# Patient Record
Sex: MALE | Employment: FULL TIME | URBAN - METROPOLITAN AREA
[De-identification: names, ages, dates, MRNs, and addresses within clinical notes are randomized per-mention and may not be internally consistent; named-entity substitution may affect disease eponyms.]

---

## 2018-01-03 NOTE — TELEPHONE ENCOUNTER
APPT INFO    Date /Time: 1/16/18 3:00 PM    Reason for Appt: oversupination   Ref Provider/Clinic: Self   Are there internal records? Yes/No?  IF YES, list clinic names: No   Are there outside records? Yes/No? No   Patient Contact (Y/N) & Call Details: Yes - spoke with patient on the phone. Stated he has no records for this. This is the first time being evaluated. No x-rays have been done.    Action: Closing encounter.

## 2018-01-16 ENCOUNTER — PRE VISIT (OUTPATIENT)
Dept: ORTHOPEDICS | Facility: CLINIC | Age: 23
End: 2018-01-16

## 2018-01-17 ENCOUNTER — OFFICE VISIT (OUTPATIENT)
Dept: ORTHOPEDICS | Facility: CLINIC | Age: 23
End: 2018-01-17
Payer: COMMERCIAL

## 2018-01-17 DIAGNOSIS — Q66.30 VARUS DEFORMITY OF FOOT: Primary | ICD-10-CM

## 2018-01-17 NOTE — MR AVS SNAPSHOT
After Visit Summary   1/17/2018    Yann Perales    MRN: [id number]           Patient Information     Date Of Birth          1995        Visit Information        Provider Department      1/17/2018 7:20 AM Mihai Matthews DPM [facility]        Today's Diagnoses     Varus deformity of foot    -  1       Follow-ups after your visit        Additional Services     ORTHOTICS REFERRAL (Foot and Ankle)       Please be aware that coverage of these services is subject to the terms and limitations of your health insurance plan.  Call member services at your health plan with any benefit or coverage questions.      Please bring the following to your appointment:    >>   Any x-rays, CTs or MRIs which have been performed.  Contact the facility where they were done to arrange for  prior to your scheduled appointment.  Any new CT, MRI or other procedures ordered by your specialist must be performed at a [facility] facility or coordinated by your clinic's referral office.    >>   List of current medications   >>   This referral request   >>   Any documents/labs given to you for this referral    ==This Referral PRINTS in the [facility] (ORTHOTICS & PROSTHETICS) Central scheduling office ==     The [facility] Central Scheduling staff will contact patient to arrange appointments. Central Scheduling Phone #:  [place], [place]  [phone]     Orthotics: Foot Orthotics    FOOT AND ANKLE ORTHOTIC PRESCRIPTION:  Men's dress                  Who to contact     Please call your clinic at [phone] to:    Ask questions about your health    Make or cancel appointments    Discuss your medicines    Learn about your test results    Speak to your doctor   If you have compliments or concerns about an experience at your clinic, or if you wish to file a complaint, please contact [facility] Patient Relations at [phone] or email us at  [email or web address]         Additional Information About Your Visit        NAVITIME JAPANhart Information     NAVITIME JAPANhart is an electronic gateway that provides easy, online access to your medical records. With Bionym, you can request a clinic appointment, read your test results, renew a prescription or communicate with your care team.     To sign up for Bionym visit the website at [email or web address]   You will be asked to enter the access code listed below, as well as some personal information. Please follow the directions to create your username and password.     Your access code is: [email or web address]  Expires: 2018  6:30 AM     Your access code will  in 90 days. If you need help or a new code, please contact your [facility] or call [phone] for assistance.        Care EveryWhere ID     This is your Care EveryWhere ID. This could be used by other organizations to access your [place] medical records  [id number]         Blood Pressure from Last 3 Encounters:   No data found for BP    Weight from Last 3 Encounters:   No data found for Wt              We Performed the Following     ORTHOTICS REFERRAL (Foot and Ankle)        Primary Care Provider    None Specified       No primary provider on file.        Equal Access to Services     OSWALD DELVALLE AH: Torey Pires, wamaurice crowley, qawilman smith, jane avendano. So [facility] [phone].    ATENCIÓN: Si habla español, tiene a shields disposición servicios gratuitos de asistencia lingüística. Llame al [phone].    We comply with applicable federal civil rights laws and [place] laws. We do not discriminate on the basis of race, color, national origin, age, disability, sex, sexual orientation, or gender identity.            Thank you!     Thank you for choosing [facility]  for your care. Our goal is always to provide you with excellent care. Hearing  back from our patients is one way we can continue to improve our services. Please take a few minutes to complete the written survey that you may receive in the mail after your visit with us. Thank you!             Your Updated Medication List - Protect others around you: Learn how to safely use, store and throw away your medicines at www.disposemymeds.org.      Notice  As of 1/17/2018  8:13 AM    You have not been prescribed any medications.

## 2018-01-17 NOTE — LETTER
Date:January 18, 2018      Patient was self referred, no letter generated. Do not send.        [facility] Health Information

## 2018-01-17 NOTE — LETTER
1/17/2018       RE: Yann Perales  [address]  [place] [address]     Dear Colleague,    Thank you for referring your patient, Yann Perales, to the [facility] at [facility]. Please see a copy of my visit note below.    Date of Service: 1/17/2018    Chief Complaint:   Chief Complaint   Patient presents with     Consult     Pt stated that he feels like he puts too much weight on the outer portion of his feet and is wondering what he can do about it.         HPI: Yann is a 22 year old male who presents today for further evaluation of wear on the outside of both shoes. He relates that this is been happening for years. He is not sure if he should be doing anything about it. He also relates that sometimes his foot feels asleep when he first wakes up. He relates that the sensation comes back immediately after stepping down.     Review of Systems: No n/v/d/f/c/ns/sob/cp    PMH: No past medical history on file.    PSxH: No past surgical history on file.    Allergies: Review of patient's allergies indicates no known allergies.    SH:   Social History     Social History     Marital status: Single     Spouse name: N/A     Number of children: N/A     Years of education: N/A     Occupational History     Not on file.     Social History Main Topics     Smoking status: Never Smoker     Smokeless tobacco: Not on file     Alcohol use Not on file     Drug use: Not on file     Sexual activity: Not on file     Other Topics Concern     Not on file     Social History Narrative     No narrative on file       FH: No family history on file.    Objective:      PT and DP pulses are 2/4 bilaterally. CRT is instant. Positive pedal hair.   Gross sensation is intact bilaterally. No neural deficits are noted today.  Equinus is not noted bilaterally. No pain with active or passive ROM of the ankle, MTJ, 1st ray, or halluces bilaterally. He has limited eversion of the STJ laterally with less  on the left versus right. Shoe wear is noted to happen at the lateral aspect of the shoe heel. Rectus foot is noted with gait.  Nails normal bilaterally. No open lesions are noted.     Assessment: Decreased range of motion of bilateral STJ's with the calcaneus inverted heel strike. This causes lateral heel wear on the shoe.    Plan:  - Pt seen and evaluated  - We can try pair of orthotics for him, however this may not correct the problem. It is not having any pain that is not a lot to fix. I did mold him for orthotics. If insurance does not cover these, he does not need to buy them out of pocket.  - See again p.r.n.          Again, thank you for allowing me to participate in the care of your patient.      Sincerely,    Mihai Matthews DPM

## 2018-01-17 NOTE — NURSING NOTE
Reason For Visit:   Chief Complaint   Patient presents with     Consult     Pt stated that he feels like he puts too much weight on the outer portion of his feet and is wondering what he can do about it.        Self referred.     Pain Assessment  Patient Currently in Pain: Denies               No current outpatient prescriptions on file.        Allergies no known allergies

## 2018-01-17 NOTE — PROGRESS NOTES
Date of Service: 1/17/2018    Chief Complaint:   Chief Complaint   Patient presents with     Consult     Pt stated that he feels like he puts too much weight on the outer portion of his feet and is wondering what he can do about it.         HPI: Yann is a 22 year old male who presents today for further evaluation of wear on the outside of both shoes. He relates that this is been happening for years. He is not sure if he should be doing anything about it. He also relates that sometimes his foot feels asleep when he first wakes up. He relates that the sensation comes back immediately after stepping down.     Review of Systems: No n/v/d/f/c/ns/sob/cp    PMH: No past medical history on file.    PSxH: No past surgical history on file.    Allergies: Review of patient's allergies indicates no known allergies.    SH:   Social History     Social History     Marital status: Single     Spouse name: N/A     Number of children: N/A     Years of education: N/A     Occupational History     Not on file.     Social History Main Topics     Smoking status: Never Smoker     Smokeless tobacco: Not on file     Alcohol use Not on file     Drug use: Not on file     Sexual activity: Not on file     Other Topics Concern     Not on file     Social History Narrative     No narrative on file       FH: No family history on file.    Objective:      PT and DP pulses are 2/4 bilaterally. CRT is instant. Positive pedal hair.   Gross sensation is intact bilaterally. No neural deficits are noted today.  Equinus is not noted bilaterally. No pain with active or passive ROM of the ankle, MTJ, 1st ray, or halluces bilaterally. He has limited eversion of the STJ laterally with less on the left versus right. Shoe wear is noted to happen at the lateral aspect of the shoe heel. Rectus foot is noted with gait.  Nails normal bilaterally. No open lesions are noted.     Assessment: Decreased range of motion of bilateral STJ's with the calcaneus inverted heel  strike. This causes lateral heel wear on the shoe.    Plan:  - Pt seen and evaluated  - We can try pair of orthotics for him, however this may not correct the problem. It is not having any pain that is not a lot to fix. I did mold him for orthotics. If insurance does not cover these, he does not need to buy them out of pocket.  - See again p.r.n.

## 2018-04-09 ENCOUNTER — OFFICE VISIT (OUTPATIENT)
Dept: SLEEP MEDICINE | Facility: CLINIC | Age: 23
End: 2018-04-09
Payer: COMMERCIAL

## 2018-04-09 VITALS
HEIGHT: 71 IN | RESPIRATION RATE: 16 BRPM | BODY MASS INDEX: 26.74 KG/M2 | WEIGHT: 191 LBS | SYSTOLIC BLOOD PRESSURE: 114 MMHG | OXYGEN SATURATION: 98 % | DIASTOLIC BLOOD PRESSURE: 78 MMHG | HEART RATE: 71 BPM

## 2018-04-09 DIAGNOSIS — E83.19 OTHER DISORDERS OF IRON METABOLISM: Primary | ICD-10-CM

## 2018-04-09 DIAGNOSIS — R06.83 SNORING: ICD-10-CM

## 2018-04-09 PROCEDURE — 99205 OFFICE O/P NEW HI 60 MIN: CPT | Performed by: INTERNAL MEDICINE

## 2018-04-09 RX ORDER — ZOLPIDEM TARTRATE 5 MG/1
TABLET ORAL
Qty: 1 TABLET | Refills: 0 | Status: SHIPPED | OUTPATIENT
Start: 2018-04-09 | End: 2018-04-18

## 2018-04-09 NOTE — NURSING NOTE
"Chief Complaint   Patient presents with     Consult       Initial /78  Pulse 71  Resp 16  Ht 1.803 m (5' 11\")  Wt 86.6 kg (191 lb)  SpO2 98%  BMI 26.64 kg/m2 Estimated body mass index is 26.64 kg/(m^2) as calculated from the following:    Height as of this encounter: 1.803 m (5' 11\").    Weight as of this encounter: 86.6 kg (191 lb).  Medication Reconciliation: complete   [facility] [place] ~[place]      "

## 2018-04-09 NOTE — PROGRESS NOTES
"SLEEP MEDICINE CONSULTATION NOTE      Visit Date:   04/09/2018      CHIEF COMPLAINT AND PURPOSE OF VISIT:  \"I was told that I stop breathing when I sleep, also have not been getting good sleep for quite a long time.\"  The patient is self-referred.      HISTORY OF PRESENT ILLNESS:  Mr. Yann Perales is a 23-year-old male who presents to the Sleep Clinic today with concerns about stopping breathing during sleep and not getting good sleep for quite a long time.  He has never had a sleep study before.        He reports snoring and witnessed apneas during sleep.  He was told by his girlfriend that his throat sort of closes off for 10 seconds or so, following which he takes a deep inhalation.  He denies snort arousals or awakening due to gasping for air or choking.  He reports chronic nasal obstruction and has dryness in the mouth upon awakening.  He reports morning headaches and denies symptoms of acid reflux.  He sleeps in all body positions.      During the work days, his bedtime is around 12:30 a.m.  He sets the alarm at 8:00 a.m., but does not get out of the bed until 9:00 a.m.  It is harder for him to get out of bed on work days.  During the weekdays, his classes start at 9:45 a.m.  During the weekends, his bedtime is around 2 a.m. and he wakes up spontaneously around 10:00 a.m. and feels comparatively a little more rested upon awakening.  However, he still reports tiredness as the day goes by.  On an average, it takes 1-2 hours for him to fall asleep, particularly during the weekdays, and he has a lot of energy and is more productive with his schoolwork in the late evening time.  He reports at least 1-2 nocturnal awakenings for no apparent reason.  He thinks maybe it is something related to the REM cycle.  He denies any significant insomnia following the nocturnal awakening.  On an average, he gets about 6 hours of sleep on a work night and 8 hours of sleep on the weekend.  He reports fatigue as the day goes by and " reports excessive daytime sleepiness, endorsing an [place] Sleepiness score of 12/24.  He denies drowsiness while driving.  He reports that on rare occasions he has dozed off during classes  and has been having a hard time reading and understanding the study material and has poor attention span.  He used to nap once a week for an hour in the past, but there were times when he would plan on a 15-minute nap on a Saturday that ended up being a 4-hour nap that through him off, so he has been trying his best to avoid naps during the day.      He reads in the bed and sometimes uses his phone and computer in the bed.      One to 2 times a month, he reports restlessness in his legs.  He feels that there is too much energy in his legs that occurs after he gets into the bed and gives him a compelling urge to move.  Moving the legs and sometimes getting up and standing makes the symptoms go away.  There is no family history of RLS.  He denies any history of iron-deficiency anemia and is not a blood donor.  The sheets and blankets are often messed up when he gets up in the morning.      He denies nightmares, sleepwalking, sleep eating or dream enactment behavior.  He reports teeth clenching.      He denies cataplexy or hallucinations, but reports occasional episodes of sleep paralysis.      FAMILY HISTORY PERTINENT TO SLEEP DISORDERS:  His paternal uncle was diagnosed with ISABEL and uses CPAP.  His father snores.      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  He drinks 1 cup of coffee per day.  He never smoked cigarettes.  He does not use illicit drugs.  He consumes alcohol once every 3 weeks.      PAST MEDICAL HISTORY:  Nothing significant.      PAST SURGICAL HISTORY:  Birthmark removal in 2008, wisdom tooth extraction in 01/2018, some other tooth removal in 2010.      CURRENT MEDICATIONS:  He occasionally takes melatonin (strength unknown) 2 times a month for his insomnia.  "    ALLERGIES:No Known Allergies     REVIEW OF SYSTEMS:  The 14 point ROS was completed. In addition to symptoms listed under HPI, and the symptoms listed below, the rest of the ROS is negative:  Depression and anxiety mostly resulting due to his tiredness and inability to concentrate.  He denies suicidal ideations or thoughts of harming others.  He has been following up with a therapist of the [facility].  Depression and anxiety but denies suicidal ideations or thoughts of harming others. He reports that his mood is affected in winter.  He does have a light box, but he has not been using it.      PHYSICAL EXAMINATION:   VITAL SIGNS: /78  Pulse 71  Resp 16  Ht 1.803 m (5' 11\")  Wt 86.6 kg (191 lb)  SpO2 98%  BMI 26.64 kg/m2  GENERAL APPEARANCE:  Normal, in no apparent cardiopulmonary distress.   NOSE, MOUTH AND THROAT:  Mildly deviated nasal septum with bilateral inferior nasal turbinate hypertrophy,  pronounced in the right nostril, with slightly reduced airflow through the right nostril.  Oropharynx:  Mallampati class 111, narrow oropharynx, low-draping soft palate, tonsillar hypertrophy 2+, high-arched palate.   NECK:  Circumference was 15.75 inches.  No palpable thyromegaly, no jugular venous distention.   CARDIOVASCULAR:  Regular S1, S2.  No gallops or murmurs.   RESPIRATORY:  Clear to auscultation bilaterally with no rales or rhonchi.   EXTREMITIES:  No calf tenderness or edema.   NEUROLOGIC AND PSYCHIATRIC:  Alert and oriented x 3, speech:normal, gait:normal, no focal neurological deficit.      IMPRESSION/REPORT/ PLAN:   1.  Reports of snoring and witnessed apneas during sleep with nonrestorative sleep, fatigue and excessive daytime sleepiness.  Possible obstructive sleep apnea is suggested based on the patient's history, male gender, positive family history of ISABEL, and the nasal and oropharyngeal anatomy.  We discussed the options of HST versus supervised sleep study.  He opted for the " supervised sleep study.  Orders were placed in Epic for a split-night PSG.  The study will be split if the AHI is more than 30 per hour.  I have provided him with a prescription for zolpidem 5 mg for self-administration by mouth during the night of the sleep study and he was strictly instructed  to take the medication only after the completion of the  connections during the night of the sleep study. He  was instructed  not to combine the melatonin with the zolpidem .  We discussed  polysomnography, pathophysiology of ISABEL,  consequences of untreated sleep apnea and the treatment options for ISABEL. We discussed eating healthy and exercising regularly.  He was  recommended to avoid driving or operating heavy machinery if drowsy or sleepy to prevent accidents.  The results of the polysomnography will be discussed with him in 1 week after the completion of the study.   2.  Initiation insomnia:multifactorial:Delayed sleep phase/mood disorders/inadequate sleep hygiene.    We discussed regularization of the sleep-wake pattern.  The initiation insomnia is most likely related to the circadian rhythm sleep disorder/delayed sleep phase.  We arrived at a goal bedtime of 1:00 a.m. and a goal wake up time of 9:00 a.m., and we discussed utilizing bright light box of 10,000 lux intensity with exposure to bright light for half an hour to 1 hour soon after awakening at 9:00 a.m.  We also discussed using 1/2 to 1 tablet of 1 mg strength of melatonin 5 hours prior to his habitual time, and since his habitual sleep time is around 2:00 a.m., he was instructed to take the melatonin at 9:00 p.m.  He was instructed to avoid driving after taking melatonin because of the sedative potential of the medication.  We   discussed stimulus control measures, optimizing sleep hygiene- avoiding activities such as reading or using electronic devices in bed, and  avoiding mind-stimulating activities before bed and minimizing the bright light exposure during  "the late evening hours.   3.  Intermittent restless legs symptoms.  Recommended obtaining serum ferritin level in a fasting state.  We will consider oral iron supplementation if the ferritin level is less than 75 ng/mL.  Nonpharmacological measures to control the RLS symptoms were discussed today.   4.  Depression and anxiety.  The insomnia could be due to the underlying mood disorders.  Encouraged the use of the light box during the winter months. He was encouraged to establish continuity of care with a primary care provider or psychiatrist at the [facility] for optimizing the management of the anxiety and depression.     The above note was dictated using voice recognition software. Although reviewed after completion, some word and grammatical error may remain . Please contact the author for any clarifications.    \"I spent a total of  60  minutes face to face with sindy Rivera during today's office visit. Over 50% of this time was spent counseling the patient and  coordinating care regarding sleep apnea, polysomnography, delayed sleep phase, regularizing the sleep-wake schedule, optimizing sleep hygiene, restless legs symptoms, mood disorders\".         BO HANNA MD             D: 2018   T: 2018   MT: BENEDICT      Name:     CHELA RIVERA   MRN:      [id number]        Account:      [id number]   :      1995           Visit Date:   2018      Document: [id number]      "

## 2018-04-09 NOTE — PROGRESS NOTES
DICTATED THE SLEEP CLINIC VISIT NOTE FOR TODAY.  Rubia Gomez MD   of Medicine,  Division of Pulmonary/Sleep Medicine  [facility].

## 2018-04-09 NOTE — MR AVS SNAPSHOT
After Visit Summary   4/9/2018    Yann Perales    MRN: [id number]           Patient Information     Date Of Birth          1995        Visit Information        Provider Department      4/9/2018 2:00 PM Rubia Gomez MD [facility], [place], Sleep Study        Today's Diagnoses     Other disorders of iron metabolism    -  1    Snoring          Care Instructions      Your BMI is Body mass index is 26.64 kg/(m^2).  Weight management is a personal decision.  If you are interested in exploring weight loss strategies, the following discussion covers the approaches that may be successful. Body mass index (BMI) is one way to tell whether you are at a healthy weight, overweight, or obese. It measures your weight in relation to your height.  A BMI of 18.5 to 24.9 is in the healthy range. A person with a BMI of 25 to 29.9 is considered overweight, and someone with a BMI of 30 or greater is considered obese. More than two-thirds of American adults are considered overweight or obese.  Being overweight or obese increases the risk for further weight gain. Excess weight may lead to heart disease and diabetes.  Creating and following plans for healthy eating and physical activity may help you improve your health.  Weight control is part of healthy lifestyle and includes exercise, emotional health, and healthy eating habits. Careful eating habits lifelong are the mainstay of weight control. Though there are significant health benefits from weight loss, long-term weight loss with diet alone may be very difficult to achieve- studies show long-term success with dietary management in less than 10% of people. Attaining a healthy weight may be especially difficult to achieve in those with severe obesity. In some cases, medications, devices and surgical management might be considered.  What can you do?  If you are overweight or obese and are interested in methods for weight loss, you should discuss  "this with your provider.     Consider reducing daily calorie intake by 500 calories.     Keep a food journal.     Avoiding skipping meals, consider cutting portions instead.    Diet combined with exercise helps maintain muscle while optimizing fat loss. Strength training is particularly important for building and maintaining muscle mass. Exercise helps reduce stress, increase energy, and improves fitness. Increasing exercise without diet control, however, may not burn enough calories to loose weight.       Start walking three days a week 10-20 minutes at a time    Work towards walking thirty minutes five days a week     Eventually, increase the speed of your walking for 1-2 minutes at time    In addition, we recommend that you review healthy lifestyles and methods for weight loss available through the National Institutes of Health patient information sites:  http://win.niddk.nih.gov/publications/index.htm    And look into health and wellness programs that may be available through your health insurance provider, employer, local community center, or braulio club.    Weight management plan: Patient was referred to their PCP to discuss a diet and exercise plan.    MY TREATMENT INFORMATION FOR SLEEP APNEA-  Yann Perales    [facility]  [facility] :      MY CONTACT NUMBER:     Am I having a sleep study at a sleep center?  Make sure you have an appointment for the study before you leave!    Am I having a home sleep study?  Watch this video:  https://www.youAMXube.com/watch?v=CteI_GhyP9g&list=[email or web address]    Frequently asked questions:  1. What is Obstructive Sleep Apnea (ISABEL)? ISABEL is the most common type of sleep apnea. Apnea means, \"without breath.\"  Apnea is most often caused by narrowing or collapse of the upper airway as muscles relax during sleep.   Almost everyone has occasional apneas. Most people with sleep apnea have had brief interruptions at night frequently " for many years.  The severity of sleep apnea is related to how frequent and severe the events are.   2. What are the consequences of ISABEL? Symptoms include: feeling sleepy during the day, snoring loudly, gasping or stopping of breathing, trouble sleeping, and occasionally morning headaches or heartburn at night.  Sleepiness can be serious and even increase the risk of falling asleep while driving. Other health consequences may include development of high blood pressure and other cardiovascular disease in persons who are susceptible. Untreated ISABEL  can contribute to heart disease, stroke and diabetes.   3. What are the treatment options? In most situations, sleep apnea is a lifelong disease that must be managed with daily therapy. Medications are not effective for sleep apnea and surgery is generally not considered until other therapies have been tried. Your treatment is your choice . Continuous Positive Airway (CPAP) works right away and is the therapy that is effective in nearly everyone. An oral device to hold your jaw forward is usually the next most reliable option. Other options include postioning devices (to keep you off your back), weight loss, and surgery including a tongue pacing device. There is more detail about some of these options below.    Important tips for using CPAP and similar devices   Know your equipment:  CPAP is continuous positive airway pressure that prevents obstructive sleep apnea by keeping the throat from collapsing while you are sleeping. In most cases, the device is  smart  and can slowly self-adjusts if your throat collapses and keeps a record every day of how well you are treated-this information is available to you and your care team.  BPAP is bilevel positive airway pressure that keeps your throat open and also assists each breath with a pressure boost to maintain adequate breathing.  Special kinds of BPAP are used in patients who have inadequate breathing from lung or heart  disease. In most cases, the device is  smart  and can slowly self-adjusts to assist breathing. Like CPAP, the device keeps a record of how well you are treated.  Your mask is your connection to the device. You get to choose what feels most comfortable and the staff will help to make sure if fits. Here: are some examples of the different masks that are available:       Key points to remember on your journey with sleep apnea:  1. Sleep study.  PAP devices often need to be adjusted during a sleep study to show that they are effective and adjusted right.  2. Good tips to remember: Try wearing just the mask during a quiet time during the day so your body adapts to wearing it. A humidifier is recommended for comfort in most cases to prevent drying of your nose and throat. Allergy medication from your provider may help you if you are having nasal congestion.  3. Getting settled-in. It takes more than one night for most of us to get used to wearing a mask. Try wearing just the mask during a quiet time during the day so your body adapts to wearing it. A humidifier is recommended for comfort in most cases. Our team will work with you carefully on the first day and will be in contact within 4 days and again at 2 and 4 weeks for advice and remote device adjustments. Your therapy is evaluated by the device each day.   4. Use it every night. The more you are able to sleep naturally for 7-8 hours, the more likely you will have good sleep and to prevent health risks or symptoms from sleep apnea. Even if you use it 4 hours it helps. Occasionally all of us are unable to use a medical therapy, in sleep apnea, it is not dangerous to miss one night.   5. Communicate. Call our skilled team on the number provided on the first day if your visit for problems that make it difficult to wear the device. Over 2 out of 3 patients can learn to wear the device long-term with help from our team. Remember to call our team or your sleep providers if  you are unable to wear the device as we may have other solutions for those who cannot adapt to mask CPAP therapy. It is recommended that you sleep your sleep provider within the first 3 months and yearly after that if you are not having problems.   Take care of your equipment. Make sure you clean your mask and tubing using directions every day and that your filter and mask are replaced as recommended or if they are not working.     BESIDES CPAP, WHAT OTHER THERAPIES ARE THERE?    Positioning Device  Positioning devices are generally used when sleep apnea is mild and only occurs on your back.This example shows a pillow that straps around the waist. It may be appropriate for those whose sleep study shows milder sleep apnea that occurs primarily when lying flat on one's back. Preliminary studies have shown benefit but effectiveness at home may need to be verified by a home sleep test. These devices are generally not covered by medical insurance.  Examples of devices that maintain sleeping on the back to prevent snoring and mild sleep apnea.    Belt type body positioner  Http://Bike [employer]UD[employer]/    Electronic reminder  Http://nightshifttherapy.com/  Http://www.[email or web address]/    Oral Appliance  What is oral appliance therapy?  An oral appliance device fits on your teeth at night like a retainer used after having braces. The device is made by a specialized dentist and requires several visits over 1-2 months before a manufactured device is made to fit your teeth and is adjusted to prevent your sleep apnea. Once an oral device is working properly, snoring should be improved. A home sleep test may be recommended at that time if to determine whether the sleep apnea is adequately treated.       Some things to remember:  -Oral devices are often, but not always, covered by your medical insurance. Be sure to check with your insurance provider.   -If you are referred for oral therapy, you will be given a list of specialized dentists  to consider or you may choose to visit the Web site of the American Academy of Dental Sleep Medicine  -Oral devices are less likely to work if you have severe sleep apnea or are extremely overweight.     More detailed information  An oral appliance is a small acrylic device that fits over the upper and lower teeth  (similar to a retainer or a mouth guard). This device slightly moves jaw forward, which moves the base of the tongue forward, opens the airway, improves breathing for effective treat snoring and obstructive sleep apnea in perhaps 7 out of 10 people .  The best working devices are custom-made by a dental device  after a mold is made of the teeth 1, 2, 3.  When is an oral appliance indicated?  Oral appliance therapy is recommended as a first-line treatment for patients with primary snoring, mild sleep apnea, and for patients with moderate sleep apnea who prefer appliance therapy to use of CPAP4, 5. Severity of sleep apnea is determined by sleep testing and is based on the number of respiratory events per hour of sleep.   How successful is oral appliance therapy?  The success rate of oral appliance therapy in patients with mild sleep apnea is 75-80% while in patients with moderate sleep apnea it is 50-70%. The chance of success in patients with severe sleep apnea is 40-50%. The research also shows that oral appliances have a beneficial effect on the cardiovascular health of ISABEL patients at the same magnitude as CPAP therapy7.  Oral appliances should be a second-line treatment in cases of severe sleep apnea, but if not completely successful then a combination therapy utilizing CPAP plus oral appliance therapy may be effective. Oral appliances tend to be effective in a broad range of patients although studies show that the patients who have the highest success are females, younger patients, those with milder disease, and less severe obesity. 3, 6.   Finding a dentist that practices dental sleep  medicine  Specific training is available through the American Academy of Dental Sleep Medicine for dentists interested in working in the field of sleep. To find a dentist who is educated in the field of sleep and the use of oral appliances, near you, visit the Web site of the American Academy of Dental Sleep Medicine.    References  1. Stephanie et al. Objectively measured vs self-reported compliance during oral appliance therapy for sleep-disordered breathing. Chest 2013; 144(5): 4728-9340.  2. Darlyn et al. Objective measurement of compliance during oral appliance therapy for sleep-disordered breathing. Thorax 2013; 68(1): 91-96.  3. Zahida, et al. Mandibular advancement devices in 620 men and women with ISABEL and snoring: tolerability and predictors of treatment success. Chest 2004; 125: 2308-6460.  4. Juan José et al. Oral appliances for snoring and ISABEL: a review. Sleep 2006; 29: 244-262.  5. Ronak et al. Oral appliance treatment for ISABEL: an update. J Clin Sleep Med 2014; 10(2): 215-227.  6. Dereje et al. Predictors of OSAH treatment outcome. J Dent Res 2007; 86: 9275-9199.      Weight Loss:    Weight loss is a long-term strategy that may improve sleep apnea in some patients.    Weight management is a personal decision and the decision should be based on your interest and the potential benefits.  If you are interested in exploring weight loss strategies, the following discussion covers the impact on weight loss on sleep apnea and the approaches that may be successful.    Being overweight does not necessarily mean you will have health consequences.  Those who have BMI over 35 or over 27 with existing medical conditions carries greater risk.   Weight loss decreases severity of sleep apnea in most people with obesity. For those with mild obesity who have developed snoring with weight gain, even 15-30 pound weight loss can improve and occasionally eliminate sleep apnea.  Structured and life-long  dietary and health habits are necessary to lose weight and keep healthier weight levels.     Though there may be significant health benefits from weight loss, long-term weight loss is very difficult to achieve- studies show success with dietary management in less than 10% of people. In addition, substantial weight loss may require years of dietary control and may be difficult if patients have severe obesity. In these cases, surgical management may be considered.  Finally, older individuals who have tolerated obesity without health complications may be less likely to benefit from weight loss strategies.        Your BMI is Body mass index is 26.64 kg/(m^2).  Weight management is a personal decision.  If you are interested in exploring weight loss strategies, the following discussion covers the approaches that may be successful. Body mass index (BMI) is one way to tell whether you are at a healthy weight, overweight, or obese. It measures your weight in relation to your height.  A BMI of 18.5 to 24.9 is in the healthy range. A person with a BMI of 25 to 29.9 is considered overweight, and someone with a BMI of 30 or greater is considered obese. More than two-thirds of American adults are considered overweight or obese.  Being overweight or obese increases the risk for further weight gain. Excess weight may lead to heart disease and diabetes.  Creating and following plans for healthy eating and physical activity may help you improve your health.  Weight control is part of healthy lifestyle and includes exercise, emotional health, and healthy eating habits. Careful eating habits lifelong are the mainstay of weight control. Though there are significant health benefits from weight loss, long-term weight loss with diet alone may be very difficult to achieve- studies show long-term success with dietary management in less than 10% of people. Attaining a healthy weight may be especially difficult to achieve in those with severe  obesity. In some cases, medications, devices and surgical management might be considered.  What can you do?  If you are overweight or obese and are interested in methods for weight loss, you should discuss this with your provider.     Consider reducing daily calorie intake by 500 calories.     Keep a food journal.     Avoiding skipping meals, consider cutting portions instead.    Diet combined with exercise helps maintain muscle while optimizing fat loss. Strength training is particularly important for building and maintaining muscle mass. Exercise helps reduce stress, increase energy, and improves fitness. Increasing exercise without diet control, however, may not burn enough calories to loose weight.       Start walking three days a week 10-20 minutes at a time    Work towards walking thirty minutes five days a week     Eventually, increase the speed of your walking for 1-2 minutes at time    In addition, we recommend that you review healthy lifestyles and methods for weight loss available through the National Institutes of Health patient information sites:  http://win.niddk.nih.gov/publications/index.htm    And look into health and wellness programs that may be available through your health insurance provider, employer, local community center, or braulio club.          Surgery:    Surgery for obstructive sleep apnea is considered generally only when other therapies fail to work. Surgery may be discussed with you if you are having a difficult time tolerating CPAP and or when there is an abnormal structure that requires surgical correction.  Nose and throat surgeries often enlarge the airway to prevent collapse.  Most of these surgeries create pain for 1-2 weeks and up to half of the most common surgeries are not effective throughout life.  You should carefully discuss the benefits and drawbacks to surgery with your sleep provider and surgeon to determine if it is the best solution for you.   More  information  Surgery for ISABEL is directed at areas that are responsible for narrowing or complete obstruction of the airway during sleep.  There are a wide range of procedures available to enlarge and/or stabilize the airway to prevent blockage of breathing in the three major areas where it can occur: the palate, tongue, and nasal regions.  Successful surgical treatment depends on the accurate identification of the factors responsible for obstructive sleep apnea in each person.  A personalized approach is required because there is no single treatment that works well for everyone.  Because of anatomic variation, consultation with an examination by a sleep surgeon is a critical first step in determining what surgical options are best for each patient.  In some cases, examination during sedation may be recommended in order to guide the selection of procedures.  Patients will be counseled about risks and benefits as well as the typical recovery course after surgery. Surgery is typically not a cure for a person s ISABEL.  However, surgery will often significantly improve one s ISABEL severity (termed  success rate ).  Even in the absence of a cure, surgery will decrease the cardiovascular risk associated with OSA7; improve overall quality of life8 (sleepiness, functionality, sleep quality, etc).      Palate Procedures:  Patients with ISABEL often have narrowing of their airway in the region of their tonsils and uvula.  The goals of palate procedures are to widen the airway in this region as well as to help the tissues resist collapse.  Modern palate procedure techniques focus on tissue conservation and soft tissue rearrangement, rather than tissue removal.  Often the uvula is preserved in this procedure. Residual sleep apnea is common in patient after pharyngoplasty with an average reduction in sleep apnea events of 33%2.      Tongue Procedures:  ExamWhile patients are awake, the muscles that surround the throat are active and  keep this region open for breathing. These muscles relax during sleep, allowing the tongue and other structures to collapse and block breathing.  There are several different tongue procedures available.  Selection of a tongue base procedure depends on characteristics seen on physical exam.  Generally, procedures are aimed at removing bulky tissues in this area or preventing the back of the tongue from falling back during sleep.  Success rates for tongue surgery range from 50-62%3.    Hypoglossal Nerve Stimulation:  Hypoglossal nerve stimulation has recently received approval from the [place] Food and Drug Administration for the treatment of obstructive sleep apnea.  This is based on research showing that the system was safe and effective in treating sleep apnea6.  Results showed that the median AHI score decreased 68%, from 29.3 to 9.0. This therapy uses an implant system that senses breathing patterns and delivers mild stimulation to airway muscles, which keeps the airway open during sleep.  The system consists of three fully implanted components: a small generator (similar in size to a pacemaker), a breathing sensor, and a stimulation lead.  Using a small handheld remote, a patient turns the therapy on before bed and off upon awakening.    Candidates for this device must be greater than 22 years of age, have moderate to severe ISABEL (AHI between 20-65), BMI less than 32, have tried CPAP/oral appliance without success, and have appropriate upper airway anatomy (determined by a sleep endoscopy performed by Dr. Vieira).    Hypoglossal Nerve Stimulation Pathway:    The sleep surgeon s office will work with the patient through the insurance prior-authorization process (including communications and appeals).    Nasal Procedures:  Nasal obstruction can interfere with nasal breathing during the day and night.  Studies have shown that relief of nasal obstruction can improve the ability of some patients to tolerate  positive airway pressure therapy for obstructive sleep apnea1.  Treatment options include medications such as nasal saline, topical corticosteroid and antihistamine sprays, and oral medications such as antihistamines or decongestants. Non-surgical treatments can include external nasal dilators for selected patients. If these are not successful by themselves, surgery can improve the nasal airway either alone or in combination with these other options.      Combination Procedures:  Combination of surgical procedures and other treatments may be recommended, particularly if patients have more than one area of narrowing or persistent positional disease.  The success rate of combination surgery ranges from 66-80%2,3.    References  1. Tessy MADSEN. The Role of the Nose in Snoring and Obstructive Sleep Apnoea: An Update.  Eur Arch Otorhinolaryngol. 2011; 268: 1365-73.  2.  Mehreen SM; Belkis JA; Mj JR; Pallanch JF; Frida MB; Romi SG; Lizbeth RODNEY. Surgical modifications of the upper airway for obstructive sleep apnea in adults: a systematic review and meta-analysis. SLEEP 2010;33(10):1682-0587. Isabelle RAMIRES. Hypopharyngeal surgery in obstructive sleep apnea: an evidence-based medicine review.  Arch Otolaryngol Head Neck Surg. 2006 Feb;132(2):206-13.  3. Paul YH1, Adeline Y, Mark MONTSERRAT. The efficacy of anatomically based multilevel surgery for obstructive sleep apnea. Otolaryngol Head Neck Surg. 2003 Oct;129(4):327-35.  4. Isabelle RAMIRES, Goldberg A. Hypopharyngeal Surgery in Obstructive Sleep Apnea: An Evidence-Based Medicine Review. Arch Otolaryngol Head Neck Surg. 2006 Feb;132(2):206-13.  5. Romulo PJ et al. Upper-Airway Stimulation for Obstructive Sleep Apnea.  N Engl J Med. 2014 Jan 9;370(2):139-49.  6. Allie Y et al. Increased Incidence of Cardiovascular Disease in Middle-aged Men with Obstructive Sleep Apnea. Am J Respir Crit Care Med; 2002 166: 159-165  7. Alessia ALEGRIA et al. Studying Life Effects and Effectiveness of  "Palatopharyngoplasty (SLEEP) study: Subjective Outcomes of Isolated Uvulopalatopharyngoplasty. Otolaryngol Head Neck Surg. 2011; 144: 623-631.      Treatment for Delayed sleep phase,  involves the following:  Good sleep habits. Please  do everything they can to develop and maintain good sleep habits and a steady sleep schedule. Habits should include going to bed and waking up at the same times; avoiding caffeinated products (eg, coffees, teas, martin, some non-cola pops, energy drinks, chocolates) avoiding other stimulants and products that can disrupt sleep (eg, alcohol, sleeping pills, nicotine); maintaining a cool, quiet and comfortable bedroom; and avoiding activities before bedtime that are stimulating (eg, computer games and television).   Shifting the bedtime schedule / Advancing the internal clock. This method simply moves the bedtime a bit earlier on each successive night until the desired bedtime is reached. For example, setting the bedtime at midnight on one night, 11:45 p.m. on the next night, 11:30 on the following night and so on. GOAL BEDTIME:1AM ; GOAL WAKE TIME: 9AM  Staying motivated to stick with the schedule. It is especially important not to lose sight of the goals during holidays and weekends. Adhering to strict bed and wake times keeps the body s clock under control but does not \"cure\" the tendency for delayed sleep phase. Once the desired bedtime is reached, you must stay motivated and stick with going to bed at the desired bedtime on a nightly basis in order to reset the internal clock. Only after several months of sticking to the schedule can there be some flexibility allowed on special occasions.   Bright light therapy. Some physicians recommend bright light therapy, which requires the purchase of special light box. Exposing  to bright light for approximately half an hour to one hour  in the morning(AT 9AM) helps to reset the body s internal clock. Reduced exposure to bright light in the " evening also helps.   Melatonin. Melatonin ( natural sleep-inducing drugs ) take one half to 1 tab  of 1 mg strength orally at 9PM.     Please do not drive/operate heavy machinery, if drowsy or sleepy;  pull over if drowsy.              Follow-ups after your visit        Follow-up notes from your care team     Return in about 1 week (around 4/16/2018).      Your next 10 appointments already scheduled     Apr 13, 2018  8:00 PM CDT   PSG Split with SLEEP STUDY RM 2   [facility] ([facility])    [address] [place] [id number] [phone]            Apr 23, 2018 11:00 AM CDT   Return Sleep Patient with Rubia Gomez MD   [facility] ([facility])    [address] [place] [id number] [phone]              Future tests that were ordered for you today     Open Future Orders        Priority Expected Expires Ordered    Ferritin Routine  6/8/2018 4/9/2018    Comprehensive Sleep Study Routine  10/6/2018 4/9/2018            Who to contact     If you have questions or need follow up information about today's clinic visit or your schedule please contact [facility] directly at [phone].  Normal or non-critical lab and imaging results will be communicated to you by [employer], letter or phone within 4 business days after the clinic has received the results. If you do not hear from us within 7 days, please contact the clinic through [employer] or phone. If you have a critical or abnormal lab result, we will notify you by phone as soon as possible.  Submit refill requests through [employer] or call your pharmacy and they will forward the refill request to us. Please allow 3 business days for your refill to be completed.          Additional Information About Your Visit        [employer] Information     [employer] lets you send messages to your doctor, view  "your test results, renew your prescriptions, schedule appointments and more. To sign up, go to [email or web address] . Click on \"Log in\" on the left side of the screen, which will take you to the Welcome page. Then click on \"Sign up Now\" on the right side of the page.     You will be asked to enter the access code listed below, as well as some personal information. Please follow the directions to create your username and password.     Your access code is: [email or web address]  Expires: 2018  3:15 PM     Your access code will  in 90 days. If you need help or a new code, please call your [facility] clinic or [phone].        Care EveryWhere ID     This is your Care EveryWhere ID. This could be used by other organizations to access your [facility] medical records  [id number]        Your Vitals Were     Pulse Respirations Height Pulse Oximetry BMI (Body Mass Index)       71 16 1.803 m (5' 11\") 98% 26.64 kg/m2        Blood Pressure from Last 3 Encounters:   18 114/78    Weight from Last 3 Encounters:   18 86.6 kg (191 lb)                 Today's Medication Changes          These changes are accurate as of 18  3:15 PM.  If you have any questions, ask your nurse or doctor.               Start taking these medicines.        Dose/Directions    zolpidem 5 MG tablet   Commonly known as:  AMBIEN   Started by:  Rubia Gomez MD        Take tablet by mouth 15 minutes prior to sleep, for Sleep Study   Quantity:  1 tablet   Refills:  0            Where to get your medicines      Some of these will need a paper prescription and others can be bought over the counter.  Ask your nurse if you have questions.     Bring a paper prescription for each of these medications     zolpidem 5 MG tablet                Primary Care Provider Fax #    Provider Not In System [phone]                Equal Access to Services     OSWALD DELVALLE AH: susy Chaney qaybta " jane gutierrez foster avendano. [facility] [phone].    ATENCIÓN: Si habla jose, tiene a shields disposición servicios gratuitos de asistencia lingüística. Julian al [phone].    We comply with applicable federal civil rights laws and [place] laws. We do not discriminate on the basis of race, color, national origin, age, disability, sex, sexual orientation, or gender identity.            Thank you!     Thank you for choosing [facility], SLEEP STUDY  for your care. Our goal is always to provide you with excellent care. Hearing back from our patients is one way we can continue to improve our services. Please take a few minutes to complete the written survey that you may receive in the mail after your visit with us. Thank you!             Your Updated Medication List - Protect others around you: Learn how to safely use, store and throw away your medicines at www.disposemymeds.org.          This list is accurate as of 4/9/18  3:15 PM.  Always use your most recent med list.                   Brand Name Dispense Instructions for use Diagnosis    zolpidem 5 MG tablet    AMBIEN    1 tablet    Take tablet by mouth 15 minutes prior to sleep, for Sleep Study

## 2018-04-09 NOTE — PATIENT INSTRUCTIONS
Your BMI is Body mass index is 26.64 kg/(m^2).  Weight management is a personal decision.  If you are interested in exploring weight loss strategies, the following discussion covers the approaches that may be successful. Body mass index (BMI) is one way to tell whether you are at a healthy weight, overweight, or obese. It measures your weight in relation to your height.  A BMI of 18.5 to 24.9 is in the healthy range. A person with a BMI of 25 to 29.9 is considered overweight, and someone with a BMI of 30 or greater is considered obese. More than two-thirds of American adults are considered overweight or obese.  Being overweight or obese increases the risk for further weight gain. Excess weight may lead to heart disease and diabetes.  Creating and following plans for healthy eating and physical activity may help you improve your health.  Weight control is part of healthy lifestyle and includes exercise, emotional health, and healthy eating habits. Careful eating habits lifelong are the mainstay of weight control. Though there are significant health benefits from weight loss, long-term weight loss with diet alone may be very difficult to achieve- studies show long-term success with dietary management in less than 10% of people. Attaining a healthy weight may be especially difficult to achieve in those with severe obesity. In some cases, medications, devices and surgical management might be considered.  What can you do?  If you are overweight or obese and are interested in methods for weight loss, you should discuss this with your provider.     Consider reducing daily calorie intake by 500 calories.     Keep a food journal.     Avoiding skipping meals, consider cutting portions instead.    Diet combined with exercise helps maintain muscle while optimizing fat loss. Strength training is particularly important for building and maintaining muscle mass. Exercise helps reduce stress, increase energy, and improves fitness.  "Increasing exercise without diet control, however, may not burn enough calories to loose weight.       Start walking three days a week 10-20 minutes at a time    Work towards walking thirty minutes five days a week     Eventually, increase the speed of your walking for 1-2 minutes at time    In addition, we recommend that you review healthy lifestyles and methods for weight loss available through the National Institutes of Health patient information sites:  http://win.niddk.nih.gov/publications/index.htm    And look into health and wellness programs that may be available through your health insurance provider, employer, local community center, or braulio club.    Weight management plan: Patient was referred to their PCP to discuss a diet and exercise plan.    MY TREATMENT INFORMATION FOR SLEEP APNEA-  Yann Perales    DOCTOR : Rubia Gomez  SLEEP CENTER :      MY CONTACT NUMBER:     Am I having a sleep study at a sleep center?  Make sure you have an appointment for the study before you leave!    Am I having a home sleep study?  Watch this video:  https://www.trend.ly.com/watch?v=CteI_GhyP9g&list=PLC4F_nvCEvSxpvRkgPszaicmjcb2PMExm    Frequently asked questions:  1. What is Obstructive Sleep Apnea (ISABEL)? ISABEL is the most common type of sleep apnea. Apnea means, \"without breath.\"  Apnea is most often caused by narrowing or collapse of the upper airway as muscles relax during sleep.   Almost everyone has occasional apneas. Most people with sleep apnea have had brief interruptions at night frequently for many years.  The severity of sleep apnea is related to how frequent and severe the events are.   2. What are the consequences of ISABEL? Symptoms include: feeling sleepy during the day, snoring loudly, gasping or stopping of breathing, trouble sleeping, and occasionally morning headaches or heartburn at night.  Sleepiness can be serious and even increase the risk of falling asleep while driving. Other " health consequences may include development of high blood pressure and other cardiovascular disease in persons who are susceptible. Untreated ISABEL  can contribute to heart disease, stroke and diabetes.   3. What are the treatment options? In most situations, sleep apnea is a lifelong disease that must be managed with daily therapy. Medications are not effective for sleep apnea and surgery is generally not considered until other therapies have been tried. Your treatment is your choice . Continuous Positive Airway (CPAP) works right away and is the therapy that is effective in nearly everyone. An oral device to hold your jaw forward is usually the next most reliable option. Other options include postioning devices (to keep you off your back), weight loss, and surgery including a tongue pacing device. There is more detail about some of these options below.    Important tips for using CPAP and similar devices   Know your equipment:  CPAP is continuous positive airway pressure that prevents obstructive sleep apnea by keeping the throat from collapsing while you are sleeping. In most cases, the device is  smart  and can slowly self-adjusts if your throat collapses and keeps a record every day of how well you are treated-this information is available to you and your care team.  BPAP is bilevel positive airway pressure that keeps your throat open and also assists each breath with a pressure boost to maintain adequate breathing.  Special kinds of BPAP are used in patients who have inadequate breathing from lung or heart disease. In most cases, the device is  smart  and can slowly self-adjusts to assist breathing. Like CPAP, the device keeps a record of how well you are treated.  Your mask is your connection to the device. You get to choose what feels most comfortable and the staff will help to make sure if fits. Here: are some examples of the different masks that are available:       Key points to remember on your journey  with sleep apnea:  1. Sleep study.  PAP devices often need to be adjusted during a sleep study to show that they are effective and adjusted right.  2. Good tips to remember: Try wearing just the mask during a quiet time during the day so your body adapts to wearing it. A humidifier is recommended for comfort in most cases to prevent drying of your nose and throat. Allergy medication from your provider may help you if you are having nasal congestion.  3. Getting settled-in. It takes more than one night for most of us to get used to wearing a mask. Try wearing just the mask during a quiet time during the day so your body adapts to wearing it. A humidifier is recommended for comfort in most cases. Our team will work with you carefully on the first day and will be in contact within 4 days and again at 2 and 4 weeks for advice and remote device adjustments. Your therapy is evaluated by the device each day.   4. Use it every night. The more you are able to sleep naturally for 7-8 hours, the more likely you will have good sleep and to prevent health risks or symptoms from sleep apnea. Even if you use it 4 hours it helps. Occasionally all of us are unable to use a medical therapy, in sleep apnea, it is not dangerous to miss one night.   5. Communicate. Call our skilled team on the number provided on the first day if your visit for problems that make it difficult to wear the device. Over 2 out of 3 patients can learn to wear the device long-term with help from our team. Remember to call our team or your sleep providers if you are unable to wear the device as we may have other solutions for those who cannot adapt to mask CPAP therapy. It is recommended that you sleep your sleep provider within the first 3 months and yearly after that if you are not having problems.   Take care of your equipment. Make sure you clean your mask and tubing using directions every day and that your filter and mask are replaced as recommended or if  they are not working.     BESIDES CPAP, WHAT OTHER THERAPIES ARE THERE?    Positioning Device  Positioning devices are generally used when sleep apnea is mild and only occurs on your back.This example shows a pillow that straps around the waist. It may be appropriate for those whose sleep study shows milder sleep apnea that occurs primarily when lying flat on one's back. Preliminary studies have shown benefit but effectiveness at home may need to be verified by a home sleep test. These devices are generally not covered by medical insurance.  Examples of devices that maintain sleeping on the back to prevent snoring and mild sleep apnea.    Belt type body positioner  Http://YumDots.Eventpig/    Electronic reminder  Http://nightshifttherapy.com/  Http://www.Yummy77.Eventpig.au/    Oral Appliance  What is oral appliance therapy?  An oral appliance device fits on your teeth at night like a retainer used after having braces. The device is made by a specialized dentist and requires several visits over 1-2 months before a manufactured device is made to fit your teeth and is adjusted to prevent your sleep apnea. Once an oral device is working properly, snoring should be improved. A home sleep test may be recommended at that time if to determine whether the sleep apnea is adequately treated.       Some things to remember:  -Oral devices are often, but not always, covered by your medical insurance. Be sure to check with your insurance provider.   -If you are referred for oral therapy, you will be given a list of specialized dentists to consider or you may choose to visit the Web site of the American Academy of Dental Sleep Medicine  -Oral devices are less likely to work if you have severe sleep apnea or are extremely overweight.     More detailed information  An oral appliance is a small acrylic device that fits over the upper and lower teeth  (similar to a retainer or a mouth guard). This device slightly moves jaw forward, which moves  the base of the tongue forward, opens the airway, improves breathing for effective treat snoring and obstructive sleep apnea in perhaps 7 out of 10 people .  The best working devices are custom-made by a dental device  after a mold is made of the teeth 1, 2, 3.  When is an oral appliance indicated?  Oral appliance therapy is recommended as a first-line treatment for patients with primary snoring, mild sleep apnea, and for patients with moderate sleep apnea who prefer appliance therapy to use of CPAP4, 5. Severity of sleep apnea is determined by sleep testing and is based on the number of respiratory events per hour of sleep.   How successful is oral appliance therapy?  The success rate of oral appliance therapy in patients with mild sleep apnea is 75-80% while in patients with moderate sleep apnea it is 50-70%. The chance of success in patients with severe sleep apnea is 40-50%. The research also shows that oral appliances have a beneficial effect on the cardiovascular health of ISABEL patients at the same magnitude as CPAP therapy7.  Oral appliances should be a second-line treatment in cases of severe sleep apnea, but if not completely successful then a combination therapy utilizing CPAP plus oral appliance therapy may be effective. Oral appliances tend to be effective in a broad range of patients although studies show that the patients who have the highest success are females, younger patients, those with milder disease, and less severe obesity. 3, 6.   Finding a dentist that practices dental sleep medicine  Specific training is available through the American Academy of Dental Sleep Medicine for dentists interested in working in the field of sleep. To find a dentist who is educated in the field of sleep and the use of oral appliances, near you, visit the Web site of the American Academy of Dental Sleep Medicine.    References  1. gracia Brown al. Objectively measured vs self-reported compliance during  oral appliance therapy for sleep-disordered breathing. Chest 2013; 144(5): 5941-4563.  2. Darlyn, et al. Objective measurement of compliance during oral appliance therapy for sleep-disordered breathing. Thorax 2013; 68(1): 91-96.  3. Zahida et al. Mandibular advancement devices in 620 men and women with ISABEL and snoring: tolerability and predictors of treatment success. Chest 2004; 125: 8556-8244.  4. Juan José, et al. Oral appliances for snoring and ISABEL: a review. Sleep 2006; 29: 244-262.  5. Ronak et al. Oral appliance treatment for ISABEL: an update. J Clin Sleep Med 2014; 10(2): 215-227.  6. Dereje et al. Predictors of OSAH treatment outcome. J Dent Res 2007; 86: 4745-3057.      Weight Loss:    Weight loss is a long-term strategy that may improve sleep apnea in some patients.    Weight management is a personal decision and the decision should be based on your interest and the potential benefits.  If you are interested in exploring weight loss strategies, the following discussion covers the impact on weight loss on sleep apnea and the approaches that may be successful.    Being overweight does not necessarily mean you will have health consequences.  Those who have BMI over 35 or over 27 with existing medical conditions carries greater risk.   Weight loss decreases severity of sleep apnea in most people with obesity. For those with mild obesity who have developed snoring with weight gain, even 15-30 pound weight loss can improve and occasionally eliminate sleep apnea.  Structured and life-long dietary and health habits are necessary to lose weight and keep healthier weight levels.     Though there may be significant health benefits from weight loss, long-term weight loss is very difficult to achieve- studies show success with dietary management in less than 10% of people. In addition, substantial weight loss may require years of dietary control and may be difficult if patients have severe obesity. In  these cases, surgical management may be considered.  Finally, older individuals who have tolerated obesity without health complications may be less likely to benefit from weight loss strategies.        Your BMI is Body mass index is 26.64 kg/(m^2).  Weight management is a personal decision.  If you are interested in exploring weight loss strategies, the following discussion covers the approaches that may be successful. Body mass index (BMI) is one way to tell whether you are at a healthy weight, overweight, or obese. It measures your weight in relation to your height.  A BMI of 18.5 to 24.9 is in the healthy range. A person with a BMI of 25 to 29.9 is considered overweight, and someone with a BMI of 30 or greater is considered obese. More than two-thirds of American adults are considered overweight or obese.  Being overweight or obese increases the risk for further weight gain. Excess weight may lead to heart disease and diabetes.  Creating and following plans for healthy eating and physical activity may help you improve your health.  Weight control is part of healthy lifestyle and includes exercise, emotional health, and healthy eating habits. Careful eating habits lifelong are the mainstay of weight control. Though there are significant health benefits from weight loss, long-term weight loss with diet alone may be very difficult to achieve- studies show long-term success with dietary management in less than 10% of people. Attaining a healthy weight may be especially difficult to achieve in those with severe obesity. In some cases, medications, devices and surgical management might be considered.  What can you do?  If you are overweight or obese and are interested in methods for weight loss, you should discuss this with your provider.     Consider reducing daily calorie intake by 500 calories.     Keep a food journal.     Avoiding skipping meals, consider cutting portions instead.    Diet combined with exercise  helps maintain muscle while optimizing fat loss. Strength training is particularly important for building and maintaining muscle mass. Exercise helps reduce stress, increase energy, and improves fitness. Increasing exercise without diet control, however, may not burn enough calories to loose weight.       Start walking three days a week 10-20 minutes at a time    Work towards walking thirty minutes five days a week     Eventually, increase the speed of your walking for 1-2 minutes at time    In addition, we recommend that you review healthy lifestyles and methods for weight loss available through the National Institutes of Health patient information sites:  http://win.niddk.nih.gov/publications/index.htm    And look into health and wellness programs that may be available through your health insurance provider, employer, local community center, or braulio club.          Surgery:    Surgery for obstructive sleep apnea is considered generally only when other therapies fail to work. Surgery may be discussed with you if you are having a difficult time tolerating CPAP and or when there is an abnormal structure that requires surgical correction.  Nose and throat surgeries often enlarge the airway to prevent collapse.  Most of these surgeries create pain for 1-2 weeks and up to half of the most common surgeries are not effective throughout life.  You should carefully discuss the benefits and drawbacks to surgery with your sleep provider and surgeon to determine if it is the best solution for you.   More information  Surgery for ISABEL is directed at areas that are responsible for narrowing or complete obstruction of the airway during sleep.  There are a wide range of procedures available to enlarge and/or stabilize the airway to prevent blockage of breathing in the three major areas where it can occur: the palate, tongue, and nasal regions.  Successful surgical treatment depends on the accurate identification of the factors  responsible for obstructive sleep apnea in each person.  A personalized approach is required because there is no single treatment that works well for everyone.  Because of anatomic variation, consultation with an examination by a sleep surgeon is a critical first step in determining what surgical options are best for each patient.  In some cases, examination during sedation may be recommended in order to guide the selection of procedures.  Patients will be counseled about risks and benefits as well as the typical recovery course after surgery. Surgery is typically not a cure for a person s ISABEL.  However, surgery will often significantly improve one s ISABLE severity (termed  success rate ).  Even in the absence of a cure, surgery will decrease the cardiovascular risk associated with OSA7; improve overall quality of life8 (sleepiness, functionality, sleep quality, etc).      Palate Procedures:  Patients with ISABEL often have narrowing of their airway in the region of their tonsils and uvula.  The goals of palate procedures are to widen the airway in this region as well as to help the tissues resist collapse.  Modern palate procedure techniques focus on tissue conservation and soft tissue rearrangement, rather than tissue removal.  Often the uvula is preserved in this procedure. Residual sleep apnea is common in patient after pharyngoplasty with an average reduction in sleep apnea events of 33%2.      Tongue Procedures:  ExamWhile patients are awake, the muscles that surround the throat are active and keep this region open for breathing. These muscles relax during sleep, allowing the tongue and other structures to collapse and block breathing.  There are several different tongue procedures available.  Selection of a tongue base procedure depends on characteristics seen on physical exam.  Generally, procedures are aimed at removing bulky tissues in this area or preventing the back of the tongue from falling back during  sleep.  Success rates for tongue surgery range from 50-62%3.    Hypoglossal Nerve Stimulation:  Hypoglossal nerve stimulation has recently received approval from the [place] Food and Drug Administration for the treatment of obstructive sleep apnea.  This is based on research showing that the system was safe and effective in treating sleep apnea6.  Results showed that the median AHI score decreased 68%, from 29.3 to 9.0. This therapy uses an implant system that senses breathing patterns and delivers mild stimulation to airway muscles, which keeps the airway open during sleep.  The system consists of three fully implanted components: a small generator (similar in size to a pacemaker), a breathing sensor, and a stimulation lead.  Using a small handheld remote, a patient turns the therapy on before bed and off upon awakening.    Candidates for this device must be greater than 22 years of age, have moderate to severe ISABEL (AHI between 20-65), BMI less than 32, have tried CPAP/oral appliance without success, and have appropriate upper airway anatomy (determined by a sleep endoscopy performed by Dr. Vieira).    Hypoglossal Nerve Stimulation Pathway:    The sleep surgeon s office will work with the patient through the insurance prior-authorization process (including communications and appeals).    Nasal Procedures:  Nasal obstruction can interfere with nasal breathing during the day and night.  Studies have shown that relief of nasal obstruction can improve the ability of some patients to tolerate positive airway pressure therapy for obstructive sleep apnea1.  Treatment options include medications such as nasal saline, topical corticosteroid and antihistamine sprays, and oral medications such as antihistamines or decongestants. Non-surgical treatments can include external nasal dilators for selected patients. If these are not successful by themselves, surgery can improve the nasal airway either alone or in combination  with these other options.      Combination Procedures:  Combination of surgical procedures and other treatments may be recommended, particularly if patients have more than one area of narrowing or persistent positional disease.  The success rate of combination surgery ranges from 66-80%2,3.    References  1. Tessy MADSEN. The Role of the Nose in Snoring and Obstructive Sleep Apnoea: An Update.  Eur Arch Otorhinolaryngol. 2011; 268: 1365-73.  2.  Mehreen SM; Belkis JA; Mj JR; Pallanch JF; Friad MB; Romi SG; Lizbeth RODNEY. Surgical modifications of the upper airway for obstructive sleep apnea in adults: a systematic review and meta-analysis. SLEEP 2010;33(10):0600-4725. Isabelle RAMIRES. Hypopharyngeal surgery in obstructive sleep apnea: an evidence-based medicine review.  Arch Otolaryngol Head Neck Surg. 2006 Feb;132(2):206-13.  3. Paul YH1, Adeline Y, Mark MONTESRRAT. The efficacy of anatomically based multilevel surgery for obstructive sleep apnea. Otolaryngol Head Neck Surg. 2003 Oct;129(4):327-35.  4. Isabelle RAMIRES, Goldberg A. Hypopharyngeal Surgery in Obstructive Sleep Apnea: An Evidence-Based Medicine Review. Arch Otolaryngol Head Neck Surg. 2006 Feb;132(2):206-13.  5. Romulo EVANS et al. Upper-Airway Stimulation for Obstructive Sleep Apnea.  N Engl J Med. 2014 Jan 9;370(2):139-49.  6. Allie Y et al. Increased Incidence of Cardiovascular Disease in Middle-aged Men with Obstructive Sleep Apnea. Am J Respir Crit Care Med; 2002 166: 159-165  7. Aggarwal EM et al. Studying Life Effects and Effectiveness of Palatopharyngoplasty (SLEEP) study: Subjective Outcomes of Isolated Uvulopalatopharyngoplasty. Otolaryngol Head Neck Surg. 2011; 144: 623-631.      Treatment for Delayed sleep phase,  involves the following:  Good sleep habits. Please  do everything they can to develop and maintain good sleep habits and a steady sleep schedule. Habits should include going to bed and waking up at the same times; avoiding caffeinated products (eg,  "coffees, teas, martin, some non-cola pops, energy drinks, chocolates) avoiding other stimulants and products that can disrupt sleep (eg, alcohol, sleeping pills, nicotine); maintaining a cool, quiet and comfortable bedroom; and avoiding activities before bedtime that are stimulating (eg, computer games and television).   Shifting the bedtime schedule / Advancing the internal clock. This method simply moves the bedtime a bit earlier on each successive night until the desired bedtime is reached. For example, setting the bedtime at midnight on one night, 11:45 p.m. on the next night, 11:30 on the following night and so on. GOAL BEDTIME:1AM ; GOAL WAKE TIME: 9AM  Staying motivated to stick with the schedule. It is especially important not to lose sight of the goals during holidays and weekends. Adhering to strict bed and wake times keeps the body s clock under control but does not \"cure\" the tendency for delayed sleep phase. Once the desired bedtime is reached, you must stay motivated and stick with going to bed at the desired bedtime on a nightly basis in order to reset the internal clock. Only after several months of sticking to the schedule can there be some flexibility allowed on special occasions.   Bright light therapy. Some physicians recommend bright light therapy, which requires the purchase of special light box. Exposing  to bright light for approximately half an hour to one hour  in the morning(AT 9AM) helps to reset the body s internal clock. Reduced exposure to bright light in the evening also helps.   Melatonin. Melatonin ( natural sleep-inducing drugs ) take one half to 1 tab  of 1 mg strength orally at 9PM.     Please do not drive/operate heavy machinery, if drowsy or sleepy;  pull over if drowsy.      "

## 2018-04-13 ENCOUNTER — THERAPY VISIT (OUTPATIENT)
Dept: SLEEP MEDICINE | Facility: CLINIC | Age: 23
End: 2018-04-13
Payer: COMMERCIAL

## 2018-04-13 DIAGNOSIS — R06.83 SNORING: ICD-10-CM

## 2018-04-13 PROCEDURE — 95810 POLYSOM 6/> YRS 4/> PARAM: CPT | Performed by: INTERNAL MEDICINE

## 2018-04-13 NOTE — MR AVS SNAPSHOT
After Visit Summary   4/13/2018    Yann Perales    MRN: [id number]           Patient Information     Date Of Birth          1995        Visit Information        Provider Department      4/13/2018 8:00 PM SLEEP STUDY RM 2 [facility], Sleep Study        Today's Diagnoses     Snoring          Care Instructions    [facility]    1. Your sleep study will be reviewed by a sleep physician within the next few days.     2. Please follow up in the sleep clinic as scheduled, or, make an appointment with your sleep provider to be seen within two weeks to discuss the results of the sleep study.    3. If you have any questions or problems with your treatment plan, please contact your sleep clinic provider at [phone] to further manage your condition.    4. Please review your attached medication list, and, at your follow-up appointment advise your sleep clinic provider about any changes.    5. Go to [email or web address] for more information about your sleep problems.    SHANNAN Wilkerson  April 14, 2018              Follow-ups after your visit        Your next 10 appointments already scheduled     Apr 23, 2018 11:00 AM CDT   Return Sleep Patient with Rubia Gomez MD   [facility], Sleep Study ([facility])    [address] [place] [id number] [phone]              Who to contact     If you have questions or need follow up information about today's clinic visit or your schedule please contact [facility], SLEEP STUDY directly at [phone].  Normal or non-critical lab and imaging results will be communicated to you by MyChart, letter or phone within 4 business days after the clinic has received the results. If you do not hear from us within 7 days, please contact the clinic through MyChart or phone. If you have a critical or abnormal lab result, we will notify you by phone as  "soon as possible.  Submit refill requests through [employer] or call your pharmacy and they will forward the refill request to us. Please allow 3 business days for your refill to be completed.          Additional Information About Your Visit        [employer]hart Information     [employer] lets you send messages to your doctor, view your test results, renew your prescriptions, schedule appointments and more. To sign up, go to [email or web address] . Click on \"Log in\" on the left side of the screen, which will take you to the Welcome page. Then click on \"Sign up Now\" on the right side of the page.     You will be asked to enter the access code listed below, as well as some personal information. Please follow the directions to create your username and password.     Your access code is: [email or web address]  Expires: 2018  3:15 PM     Your access code will  in 90 days. If you need help or a new code, please call your [place] clinic or [phone].        Care EveryWhere ID     This is your Care EveryWhere ID. This could be used by other organizations to access your [facility] medical records  [id number]         Blood Pressure from Last 3 Encounters:   18 114/78    Weight from Last 3 Encounters:   18 86.6 kg (191 lb)              We Performed the Following     Comprehensive Sleep Study        Primary Care Provider Fax #    Provider Not In System [phone]                Equal Access to Services     [facility]: Hadii nona Pires, waaxda carline, qaybta kaalmamagda smiht, jane hudson . So [facility] [phone].    ATENCIÓN: Si habla español, tiene a shields disposición servicios gratuitos de asistencia lingüística. Giacomoame al [phone].    We comply with applicable federal civil rights laws and [place] laws. We do not discriminate on the basis of race, color, national origin, age, disability, sex, sexual orientation, or gender identity.            Thank you!     [facility] you " [facility]  [facility] your [facility]. Our goal is always to provide you with excellent care. Hearing back from our patients is one way we can continue to improve our services. Please take a few minutes to complete the written survey that you may receive in the mail after your visit with us. Thank you!             Your Updated Medication List - Protect others around you: Learn how to safely use, store and throw away your medicines at www.disposemymeds.org.          This list is accurate as of 4/13/18 11:59 PM.  Always use your most recent med list.                   Brand Name Dispense Instructions for use Diagnosis    zolpidem 5 MG tablet    AMBIEN    1 tablet    Take tablet by mouth 15 minutes prior to sleep, for Sleep Study

## 2018-04-14 NOTE — PATIENT INSTRUCTIONS
[place] [facility]    1. Your sleep study will be reviewed by a sleep physician within the next few days.     2. Please follow up in the sleep clinic as scheduled, or, make an appointment with your sleep provider to be seen within two weeks to discuss the results of the sleep study.    3. If you have any questions or problems with your treatment plan, please contact your sleep clinic provider at [phone] to further manage your condition.    4. Please review your attached medication list, and, at your follow-up appointment advise your sleep clinic provider about any changes.    5. Go to [email or web address] for more information about your sleep problems.    SHANNAN Wilkerson  April 14, 2018

## 2018-04-18 ENCOUNTER — APPOINTMENT (OUTPATIENT)
Dept: CT IMAGING | Facility: CLINIC | Age: 23
End: 2018-04-18
Attending: EMERGENCY MEDICINE
Payer: COMMERCIAL

## 2018-04-18 ENCOUNTER — HOSPITAL ENCOUNTER (EMERGENCY)
Facility: CLINIC | Age: 23
Discharge: HOME | End: 2018-04-18
Attending: EMERGENCY MEDICINE | Admitting: EMERGENCY MEDICINE
Payer: COMMERCIAL

## 2018-04-18 VITALS
RESPIRATION RATE: 16 BRPM | DIASTOLIC BLOOD PRESSURE: 64 MMHG | TEMPERATURE: 98.4 F | WEIGHT: 195 LBS | SYSTOLIC BLOOD PRESSURE: 134 MMHG | OXYGEN SATURATION: 100 % | HEART RATE: 59 BPM | BODY MASS INDEX: 27.2 KG/M2

## 2018-04-18 DIAGNOSIS — R55 SYNCOPE, UNSPECIFIED SYNCOPE TYPE: ICD-10-CM

## 2018-04-18 LAB
ANION GAP SERPL CALCULATED.3IONS-SCNC: 10 MMOL/L (ref 3–14)
BASOPHILS # BLD AUTO: 0 10E9/L (ref 0–0.2)
BASOPHILS NFR BLD AUTO: 0.2 %
BUN SERPL-MCNC: 21 MG/DL (ref 7–30)
CALCIUM SERPL-MCNC: 9.5 MG/DL (ref 8.5–10.1)
CHLORIDE SERPL-SCNC: 106 MMOL/L (ref 94–109)
CO2 SERPL-SCNC: 26 MMOL/L (ref 20–32)
CREAT SERPL-MCNC: 1.01 MG/DL (ref 0.66–1.25)
DIFFERENTIAL METHOD BLD: NORMAL
EOSINOPHIL # BLD AUTO: 0.1 10E9/L (ref 0–0.7)
EOSINOPHIL NFR BLD AUTO: 1.5 %
ERYTHROCYTE [DISTWIDTH] IN BLOOD BY AUTOMATED COUNT: 11.5 % (ref 10–15)
GFR SERPL CREATININE-BSD FRML MDRD: >90 ML/MIN/1.7M2
GLUCOSE SERPL-MCNC: 106 MG/DL (ref 70–99)
HCT VFR BLD AUTO: 41.9 % (ref 40–53)
HGB BLD-MCNC: 14.4 G/DL (ref 13.3–17.7)
IMM GRANULOCYTES # BLD: 0 10E9/L (ref 0–0.4)
IMM GRANULOCYTES NFR BLD: 0.2 %
LYMPHOCYTES # BLD AUTO: 3.1 10E9/L (ref 0.8–5.3)
LYMPHOCYTES NFR BLD AUTO: 49.7 %
MCH RBC QN AUTO: 32.7 PG (ref 26.5–33)
MCHC RBC AUTO-ENTMCNC: 34.4 G/DL (ref 31.5–36.5)
MCV RBC AUTO: 95 FL (ref 78–100)
MONOCYTES # BLD AUTO: 0.4 10E9/L (ref 0–1.3)
MONOCYTES NFR BLD AUTO: 6 %
NEUTROPHILS # BLD AUTO: 2.6 10E9/L (ref 1.6–8.3)
NEUTROPHILS NFR BLD AUTO: 42.4 %
NRBC # BLD AUTO: 0 10*3/UL
NRBC BLD AUTO-RTO: 0 /100
PLATELET # BLD AUTO: 235 10E9/L (ref 150–450)
POTASSIUM SERPL-SCNC: 3.4 MMOL/L (ref 3.4–5.3)
RBC # BLD AUTO: 4.4 10E12/L (ref 4.4–5.9)
SLPCOMP: NORMAL
SODIUM SERPL-SCNC: 142 MMOL/L (ref 133–144)
TROPONIN I SERPL-MCNC: <0.015 UG/L (ref 0–0.04)
TSH SERPL DL<=0.005 MIU/L-ACNC: 3.04 MU/L (ref 0.4–4)
WBC # BLD AUTO: 6.1 10E9/L (ref 4–11)

## 2018-04-18 PROCEDURE — 85025 COMPLETE CBC W/AUTO DIFF WBC: CPT | Performed by: EMERGENCY MEDICINE

## 2018-04-18 PROCEDURE — 80048 BASIC METABOLIC PNL TOTAL CA: CPT | Performed by: EMERGENCY MEDICINE

## 2018-04-18 PROCEDURE — 70450 CT HEAD/BRAIN W/O DYE: CPT

## 2018-04-18 PROCEDURE — 84484 ASSAY OF TROPONIN QUANT: CPT | Performed by: EMERGENCY MEDICINE

## 2018-04-18 PROCEDURE — 25000132 ZZH RX MED GY IP 250 OP 250 PS 637: Performed by: EMERGENCY MEDICINE

## 2018-04-18 PROCEDURE — 25000128 H RX IP 250 OP 636: Performed by: EMERGENCY MEDICINE

## 2018-04-18 PROCEDURE — 84443 ASSAY THYROID STIM HORMONE: CPT | Performed by: EMERGENCY MEDICINE

## 2018-04-18 RX ORDER — IBUPROFEN 600 MG/1
600 TABLET, FILM COATED ORAL ONCE
Status: COMPLETED | OUTPATIENT
Start: 2018-04-18 | End: 2018-04-18

## 2018-04-18 RX ORDER — ACETAMINOPHEN 325 MG/1
975 TABLET ORAL ONCE
Status: COMPLETED | OUTPATIENT
Start: 2018-04-18 | End: 2018-04-18

## 2018-04-18 RX ADMIN — SODIUM CHLORIDE 1000 ML: 9 INJECTION, SOLUTION INTRAVENOUS at 19:53

## 2018-04-18 RX ADMIN — ACETAMINOPHEN 975 MG: 325 TABLET, FILM COATED ORAL at 19:50

## 2018-04-18 RX ADMIN — IBUPROFEN 600 MG: 600 TABLET ORAL at 19:50

## 2018-04-18 NOTE — LETTER
April 18, 2018      To Whom It May Concern:      Yann Perales was seen in our Emergency Department today, 04/18/18.  I expect his condition to improve over the next 1-2 days.  He may return to work/school when improved. Please excuse him from his assignment deadline as he was in our emergency department. If you have any questions or concerns, please do not hesitate to contact me. Thank you.    Sincerely,          Ace Marroquin, DO  Emergency Medicine  Pager: [phone]

## 2018-04-18 NOTE — ED AVS SNAPSHOT
[facility], Emergency Department    [address] [address]    Phone:  [phone]                                       Yann Perales   MRN: [id number]    Department:  [facility], Emergency Department   Date of Visit:  4/18/2018           Patient Information     Date Of Birth          1995        Your diagnoses for this visit were:     Syncope, unspecified syncope type        You were seen by Ace Marroquin MD.      Follow-up Information     Follow up with [facility], Emergency Department.    Specialty:  EMERGENCY MEDICINE    Why:  If symptoms worsen    Contact information:    [address] [id number] [phone]    Additional information:    The [facility] is located on the corner of [address] and [address] on the [address] [facility]. It is easily accessible from virtually any point in the [facility] area, via [employer] and [employer].        Discharge Instructions       Please make an appointment to follow up with [facility] (phone: [phone] or [facility] (phone: [phone]) in 5-7 days as needed.    Discharge Instructions  Syncope    Syncope (fainting) is a sudden, short loss of consciousness (passing out spell). People will usually fall to the ground when they faint or slump over if seated.  At this time your doctor does not find that your fainting spell is a sign of anything dangerous or life-threatening.  However, sometimes the signs of serious illness do not show up right away.  If you have new or worse symptoms, you may need to be seen again in the Emergency Department or by your primary doctor. Be sure to follow up as directed, or at least within 1 week.      Return to the Emergency Department if:    You faint again.     You have any significant bleeding.    You have chest pain or fast heartbeat, or if your heartbeat is irregular or skipping beats.    You feel short of breath.    You  cough up any blood.    You have belly (abdominal) pain or unusual back pain.    You have ongoing vomiting (throwing up) or diarrhea, or have a black or tarry bowel movement or blood in the bowels or blood in your vomit.    You have a fever over 101 degrees.    You lose feeling or cannot move a part of your body or cannot talk normally.    You are confused, have a headache, cannot see well, or have a seizure.    DO NOT DRIVE. CALL 911 INSTEAD!    What can I do to help myself?    Follow any specific instructions that your provider discussed with you.    If you feel light-headed, make sure to sit down right away, even if you have to sit on the floor.    Follow up with your regular medical doctor as discussed for further management. This may include lowering your blood pressure medications, insulin or other diabetic medications, checking your blood sugar more frequently, and drinking more fluids, taking medicines for vomiting or diarrhea or getting up slower.  If you were given a prescription for medicine here today, be sure to read all of the information (including the package insert) that comes with your prescription.  This will include important information about the medicine, its side effects, and any warnings that you need to know about.  The pharmacist who fills the prescription can provide more information and answer questions you may have about the medicine.  If you have questions or concerns that the pharmacist cannot address, please call or return to the Emergency Department.     Opioid Medication Information    Pain medications are among the most commonly prescribed medicines, so we are including this information for all our patients. If you did not receive pain medication or get a prescription for pain medicine, you can ignore it.     You may have been given a prescription for an opioid (narcotic) pain medicine and/or have received a pain medicine while here in the Emergency Department. These medicines can  make you drowsy or impaired. You must not drive, operate dangerous equipment, or engage in any other dangerous activities while taking these medications. If you drive while taking these medications, you could be arrested for DUI, or driving under the influence. Do not drink any alcohol while you are taking these medications.     Opioid pain medications can cause addiction. If you have a history of chemical dependency of any type, you are at a higher risk of becoming addicted to pain medications.  Only take these prescribed medications to treat your pain when all other options have been tried. Take it for as short a time and as few doses as possible. Store your pain pills in a secure place, as they are frequently stolen and provide a dangerous opportunity for children or visitors in your house to start abusing these powerful medications. We will not replace any lost or stolen medicine.  As soon as your pain is better, you should flush all your remaining medication.     Many prescription pain medications contain Tylenol  (acetaminophen), including Vicodin , Tylenol #3 , Norco , Lortab , and Percocet .  You should not take any extra pills of Tylenol  if you are using these prescription medications or you can get very sick.  Do not ever take more than 3000 mg of acetaminophen in any 24 hour period.    Remember that you can always come back to the Emergency Department if you are not able to see your regular doctor in the amount of time listed above, if you get any new symptoms, or if there is anything that worries you.        Your next 10 appointments already scheduled     Apr 23, 2018 11:00 AM CDT   Return Sleep Patient with Rubia Gomez MD   [facility] [place], Sleep Study ([facility])    [address] [place] [id number] [phone]              24 Hour Appointment Hotline       To make an appointment at any [facility], call  "[phone] ([phone]). If you don't have a family doctor or clinic, we will help you find one. [facility] clinics are conveniently located to serve the needs of you and your family.             Review of your medicines      Notice     You have not been prescribed any medications.            Procedures and tests performed during your visit     Basic metabolic panel    CBC with platelets differential    CT Head w/o Contrast    EKG 12 lead    POC US ECHO LIMITED    TSH with free T4 reflex    Troponin I      Orders Needing Specimen Collection     None      Pending Results     Date and Time Order Name Status Description    4/18/2018 1858 EKG 12 lead Preliminary             Pending Culture Results     No orders found from 4/16/2018 to 4/19/2018.            Pending Results Instructions     If you had any lab results that were not finalized at the time of your Discharge, you can call the ED Lab Result RN at [phone]. You will be contacted by this team for any positive Lab results or changes in treatment. The nurses are available 7 days a week from 10A to 6:30P.  You can leave a message 24 hours per day and they will return your call.        Thank you for choosing [facility]       Thank you for choosing [facility] for your care. Our goal is always to provide you with excellent care. Hearing back from our patients is one way we can continue to improve our services. Please take a few minutes to complete the written survey that you may receive in the mail after you visit with us. Thank you!        [employer] Information     [employer] lets you send messages to your doctor, view your test results, renew your prescriptions, schedule appointments and more. To sign up, go to [email or web address] . Click on \"Log in\" on the left side of the screen, which will take you to the Welcome page. Then click on \"Sign up Now\" on the right side of the page.     You will be asked to enter the access code listed below, as well as some " personal information. Please follow the directions to create your username and password.     Your access code is: [email or web address]  Expires: 2018  3:15 PM     Your access code will  in 90 days. If you need help or a new code, please call your [facility] clinic or [phone].        Care EveryWhere ID     This is your Care EveryWhere ID. This could be used by other organizations to access your [place] medical records  [id number]        Equal Access to Services     [facility] : Torey kempo Sobeverly, waaxda luqadaha, qaybta kaalmada adenandini, jane hudson . So [facility] [phone].    ATENCIÓN: Si habla español, tiene a shields disposición servicios gratuitos de asistencia lingüística. Llame al [phone].    We comply with applicable federal civil rights laws and [place] laws. We do not discriminate on the basis of race, color, national origin, age, disability, sex, sexual orientation, or gender identity.            After Visit Summary       This is your record. Keep this with you and show to your community pharmacist(s) and doctor(s) at your next visit.

## 2018-04-18 NOTE — ED AVS SNAPSHOT
[facility] [place], Emergency Department    [address] [address]    Phone:  [phone]                                       Yann Perales   MRN: [id number]    Department:  [facility], Emergency Department   Date of Visit:  4/18/2018           After Visit Summary Signature Page     I have received my discharge instructions, and my questions have been answered. I have discussed any challenges I see with this plan with the nurse or doctor.    ..........................................................................................................................................  Patient/Patient Representative Signature      ..........................................................................................................................................  Patient Representative Print Name and Relationship to Patient    ..................................................               ................................................  Date                                            Time    ..........................................................................................................................................  Reviewed by Signature/Title    ...................................................              ..............................................  Date                                                            Time

## 2018-04-19 LAB — INTERPRETATION ECG - MUSE: NORMAL

## 2018-04-19 NOTE — DISCHARGE INSTRUCTIONS
Please make an appointment to follow up with [facility] (phone: [phone] or [facility] (phone: [phone]) in 5-7 days as needed.    Discharge Instructions  Syncope    Syncope (fainting) is a sudden, short loss of consciousness (passing out spell). People will usually fall to the ground when they faint or slump over if seated.  At this time your doctor does not find that your fainting spell is a sign of anything dangerous or life-threatening.  However, sometimes the signs of serious illness do not show up right away.  If you have new or worse symptoms, you may need to be seen again in the Emergency Department or by your primary doctor. Be sure to follow up as directed, or at least within 1 week.      Return to the Emergency Department if:    You faint again.     You have any significant bleeding.    You have chest pain or fast heartbeat, or if your heartbeat is irregular or skipping beats.    You feel short of breath.    You cough up any blood.    You have belly (abdominal) pain or unusual back pain.    You have ongoing vomiting (throwing up) or diarrhea, or have a black or tarry bowel movement or blood in the bowels or blood in your vomit.    You have a fever over 101 degrees.    You lose feeling or cannot move a part of your body or cannot talk normally.    You are confused, have a headache, cannot see well, or have a seizure.    DO NOT DRIVE. CALL 911 INSTEAD!    What can I do to help myself?    Follow any specific instructions that your provider discussed with you.    If you feel light-headed, make sure to sit down right away, even if you have to sit on the floor.    Follow up with your regular medical doctor as discussed for further management. This may include lowering your blood pressure medications, insulin or other diabetic medications, checking your blood sugar more frequently, and drinking more fluids, taking medicines for vomiting or diarrhea or getting up slower.  If you  were given a prescription for medicine here today, be sure to read all of the information (including the package insert) that comes with your prescription.  This will include important information about the medicine, its side effects, and any warnings that you need to know about.  The pharmacist who fills the prescription can provide more information and answer questions you may have about the medicine.  If you have questions or concerns that the pharmacist cannot address, please call or return to the Emergency Department.     Opioid Medication Information    Pain medications are among the most commonly prescribed medicines, so we are including this information for all our patients. If you did not receive pain medication or get a prescription for pain medicine, you can ignore it.     You may have been given a prescription for an opioid (narcotic) pain medicine and/or have received a pain medicine while here in the Emergency Department. These medicines can make you drowsy or impaired. You must not drive, operate dangerous equipment, or engage in any other dangerous activities while taking these medications. If you drive while taking these medications, you could be arrested for DUI, or driving under the influence. Do not drink any alcohol while you are taking these medications.     Opioid pain medications can cause addiction. If you have a history of chemical dependency of any type, you are at a higher risk of becoming addicted to pain medications.  Only take these prescribed medications to treat your pain when all other options have been tried. Take it for as short a time and as few doses as possible. Store your pain pills in a secure place, as they are frequently stolen and provide a dangerous opportunity for children or visitors in your house to start abusing these powerful medications. We will not replace any lost or stolen medicine.  As soon as your pain is better, you should flush all your remaining medication.      Many prescription pain medications contain Tylenol  (acetaminophen), including Vicodin , Tylenol #3 , Norco , Lortab , and Percocet .  You should not take any extra pills of Tylenol  if you are using these prescription medications or you can get very sick.  Do not ever take more than 3000 mg of acetaminophen in any 24 hour period.    Remember that you can always come back to the Emergency Department if you are not able to see your regular doctor in the amount of time listed above, if you get any new symptoms, or if there is anything that worries you.

## 2018-04-19 NOTE — ED PROVIDER NOTES
"  History     Chief Complaint   Patient presents with     Syncope     HPI  Yann Perales is a 23 year old otherwise healthy male who presents for evaluation following a syncopal episode. Patient reports he was in a restaurant this evening when he stood up from the table he was at, took a really big gulp of cold water which \"kind of hurt going down\" and then a sudden \"head rush\" and subsequently lost consciousness and fell to the floor. Patient does not remember what happened after he lost consciousness, does not remember how long he was unconscious for, but does think he hit the occipital aspect of his head. This was a witnessed event, and patient reports a witness told him it looked like he was seizing. However, patient reports when he regained consciousness he was not confused, and was able to recognize he was on the floor of the restaurant and knew that he had lost consciousness. He also reports feeling very hot and diaphoretic. No history of syncope or similar symptoms. Currently, his only complaint is mild lightheadedness. He denies chest pain, headache prior to or following the episode, no shortness of breath, abdominal pain, nausea, or vomiting. No dysuria, urinary frequency, diarrhea, or constipation. He has no family history of bleeding disorders or deaths at a young age. His only surgical history is wisdom teeth removal. He does not take any daily medications except for over the counter vitamins. He is currently being evaluated for possible sleep apnea in the outpatient setting, and did complete a sleep study last weekend. He drinks alcohol once every three weeks. He is a non-smoker, and denies any other substance abuse. He is currently a student.     I have reviewed the Medications, Allergies, Past Medical and Surgical History, and Social History in the Epic system.  No past medical history on file.    No past surgical history on file.    No family history on file.    Social History   Substance Use Topics " "    Smoking status: Never Smoker     Smokeless tobacco: Never Used     Alcohol use Yes      Comment: very occasionally       No current facility-administered medications for this encounter.      No current outpatient prescriptions on file.      No Known Allergies    Review of Systems  ROS: 14 point ROS neg other than the symptoms noted above in the HPI.    Physical Exam   BP: 133/84  Pulse: 59  Temp: 98.4  F (36.9  C)  Resp: 16  Weight: 88.5 kg (195 lb)  SpO2: 98 %      Physical Exam    ED Course     ED Course     Procedures  Results for orders placed during the hospital encounter of 04/18/18   POC US ECHO LIMITED    [facility] Procedure Note     Limited Bedside ED Cardiac Ultrasound:    PROCEDURE: PERFORMED BY: Dr. Ace Marroquin  INDICATIONS/SYMPTOM:  Syncope  PROBE: Cardiac phased array probe  BODY LOCATION: Chest  FINDINGS:   The ultrasound was performed utilizing the subcostal, parasternal long axis, parasternal short axis and apical 4 chamber views.  Cardiac contractility:  Present  Gross estimation of cardiac kinesis: normal  Pericardial Effusion:  None  RV:LV ratio: LV > RV  IVC:    Diameter:  IVC diameter expiration (IVCe) 2.5 cm                                                   IVC diameter inspiration (IVCi) 2.5 cm                                                       Collapsibility:  IVC collapses < 50% with inspiration  INTERPRETATION:    Chamber size and motion were grossly normal with LV > RV, normal cardiac kinesis.  No pericardial effusion was found.  IVC visualized and findings indicate normovolemia.  IMAGE DOCUMENTATION: Images were archived to PACs system.      Ace Marroquin DO  Emergency Medicine  Pager: [phone]         7:25 PM  The patient was seen and examined by Dr. Marroquin in Room 22.     {EKG done?:780681::\" \"}    Critical Care time:  {none or minutes:786716::\"none\"}  {trauma activation or Fall?:677287::\" \"}  {Sepsis/Septic " "Shock/Stemi/Stroke:287441::\" \"}       Labs Ordered and Resulted from Time of ED Arrival Up to the Time of Departure from the ED   BASIC METABOLIC PANEL - Abnormal; Notable for the following:        Result Value    Glucose 106 (*)     All other components within normal limits   CBC WITH PLATELETS DIFFERENTIAL   TROPONIN I   TSH WITH FREE T4 REFLEX   ORTHOSTATIC BLOOD PRESSURE AND PULSE            Assessments & Plan (with Medical Decision Making)   ***    I have reviewed the nursing notes.    I have reviewed the findings, diagnosis, plan and need for follow up with the patient.    Ace Marroquin, DO  Emergency Medicine  Pager: [phone]    Final diagnoses:   Syncope, unspecified syncope type     I, Heidi Hadrin, am serving as a trained medical scribe to document services personally performed by Eugene Mcbride MD, based on the provider's statements to me.   I, Eugene Mcbride MD, was physically present and have reviewed and verified the accuracy of this note documented by Heidi Hardin.      4/18/2018   [facility], EMERGENCY DEPARTMENT  "

## 2018-04-23 ENCOUNTER — OFFICE VISIT (OUTPATIENT)
Dept: SLEEP MEDICINE | Facility: CLINIC | Age: 23
End: 2018-04-23
Payer: COMMERCIAL

## 2018-04-23 ENCOUNTER — DOCUMENTATION ONLY (OUTPATIENT)
Dept: SLEEP MEDICINE | Facility: CLINIC | Age: 23
End: 2018-04-23

## 2018-04-23 ENCOUNTER — TELEPHONE (OUTPATIENT)
Dept: SLEEP MEDICINE | Facility: CLINIC | Age: 23
End: 2018-04-23

## 2018-04-23 VITALS
DIASTOLIC BLOOD PRESSURE: 81 MMHG | BODY MASS INDEX: 26.46 KG/M2 | RESPIRATION RATE: 16 BRPM | SYSTOLIC BLOOD PRESSURE: 132 MMHG | OXYGEN SATURATION: 96 % | HEART RATE: 79 BPM | HEIGHT: 71 IN | WEIGHT: 189 LBS

## 2018-04-23 DIAGNOSIS — G47.33 OSA (OBSTRUCTIVE SLEEP APNEA): Primary | ICD-10-CM

## 2018-04-23 PROCEDURE — 99214 OFFICE O/P EST MOD 30 MIN: CPT | Performed by: INTERNAL MEDICINE

## 2018-04-23 NOTE — PROGRESS NOTES
I called and verified patient's insurance [employer] [place]. Per rep Libby COSBY, there is no prior auth needed for the HCPCs , , & . The patient has the option to rent for 10 months or outright purchase the machine. If rented, compliance is not required. Called nurse's line at [phone] and left message with Asia HECK to inform provider.      Effective date: 8/21/17  Codes verified: , ,   Coverage %: 80/20 -MEMBER WILL BE COVERED % AFTER SATISFYING THE OOP  Deductible: 0- NO DEDUCTIBLE  Amount met: 0  Out of Pocket: 6250.00  Amount met: 166.00

## 2018-04-23 NOTE — MR AVS SNAPSHOT
After Visit Summary   4/23/2018    Yann Perales    MRN: [id number]           Patient Information     Date Of Birth          1995        Visit Information        Provider Department      4/23/2018 11:00 AM Rubia Gomez MD [facility] [place], Sleep Study        Today's Diagnoses     ISABEL (obstructive sleep apnea)    -  1      Care Instructions      Your BMI is Body mass index is 26.36 kg/(m^2).  Weight management is a personal decision.  If you are interested in exploring weight loss strategies, the following discussion covers the approaches that may be successful. Body mass index (BMI) is one way to tell whether you are at a healthy weight, overweight, or obese. It measures your weight in relation to your height.  A BMI of 18.5 to 24.9 is in the healthy range. A person with a BMI of 25 to 29.9 is considered overweight, and someone with a BMI of 30 or greater is considered obese. More than two-thirds of American adults are considered overweight or obese.  Being overweight or obese increases the risk for further weight gain. Excess weight may lead to heart disease and diabetes.  Creating and following plans for healthy eating and physical activity may help you improve your health.  Weight control is part of healthy lifestyle and includes exercise, emotional health, and healthy eating habits. Careful eating habits lifelong are the mainstay of weight control. Though there are significant health benefits from weight loss, long-term weight loss with diet alone may be very difficult to achieve- studies show long-term success with dietary management in less than 10% of people. Attaining a healthy weight may be especially difficult to achieve in those with severe obesity. In some cases, medications, devices and surgical management might be considered.  What can you do?  If you are overweight or obese and are interested in methods for weight loss, you should discuss this with your  provider.     Consider reducing daily calorie intake by 500 calories.     Keep a food journal.     Avoiding skipping meals, consider cutting portions instead.    Diet combined with exercise helps maintain muscle while optimizing fat loss. Strength training is particularly important for building and maintaining muscle mass. Exercise helps reduce stress, increase energy, and improves fitness. Increasing exercise without diet control, however, may not burn enough calories to loose weight.       Start walking three days a week 10-20 minutes at a time    Work towards walking thirty minutes five days a week     Eventually, increase the speed of your walking for 1-2 minutes at time    In addition, we recommend that you review healthy lifestyles and methods for weight loss available through the National Institutes of Health patient information sites:  http://win.niddk.nih.gov/publications/index.htm    And look into health and wellness programs that may be available through your health insurance provider, employer, local community center, or braulio club.    Weight management plan: Patient was referred to their PCP to discuss a diet and exercise plan.              Follow-ups after your visit        Who to contact     If you have questions or need follow up information about today's clinic visit or your schedule please contact [facility] directly at [phone].  Normal or non-critical lab and imaging results will be communicated to you by MyChart, letter or phone within 4 business days after the clinic has received the results. If you do not hear from us within 7 days, please contact the clinic through [employer] In[facility] or phone. If you have a critical or abnormal lab result, we will notify you by phone as soon as possible.  Submit refill requests through [employer] group or call your pharmacy and they will forward the refill request to us. Please allow 3 business days for your refill to be completed.          Additional  "Information About Your Visit        MyChart Information     [employer] lets you send messages to your doctor, view your test results, renew your prescriptions, schedule appointments and more. To sign up, go to [email or web address] . Click on \"Log in\" on the left side of the screen, which will take you to the Welcome page. Then click on \"Sign up Now\" on the right side of the page.     You will be asked to enter the access code listed below, as well as some personal information. Please follow the directions to create your username and password.     Your access code is: [email or web address]  Expires: 2018  3:15 PM     Your access code will  in 90 days. If you need help or a new code, please call your [place] clinic or [phone].        Care EveryWhere ID     This is your Care EveryWhere ID. This could be used by other organizations to access your [place] medical records  [id number]        Your Vitals Were     Pulse Respirations Height Pulse Oximetry BMI (Body Mass Index)       79 16 1.803 m (5' 11\") 96% 26.36 kg/m2        Blood Pressure from Last 3 Encounters:   18 132/81   18 134/64   18 114/78    Weight from Last 3 Encounters:   18 85.7 kg (189 lb)   18 88.5 kg (195 lb)   18 86.6 kg (191 lb)              We Performed the Following     Comprehensive DME        Primary Care Provider Fax #    [facility] [phone]       [address] [place] [address]        Equal Access to Services     [facility]: Hadii nona marsh [place], waaxda luqadaha, qaybta kaalmajane ford . So [facility] [phone].    ATENCIÓN: Si habla español, tiene a shields disposición servicios gratuitos de asistencia lingüística. Llame al [phone].    We comply with applicable federal civil rights laws and [place] laws. We do not discriminate on the basis of race, color, national origin, age, disability, sex, sexual orientation, or " gender identity.            Thank you!     Thank you for choosing [facility]  for your care. Our goal is always to provide you with excellent care. Hearing back from our patients is one way we can continue to improve our services. Please take a few minutes to complete the written survey that you may receive in the mail after your visit with us. Thank you!             Your Updated Medication List - Protect others around you: Learn how to safely use, store and throw away your medicines at www.disposemymeds.org.      Notice  As of 4/23/2018  7:33 PM    You have not been prescribed any medications.

## 2018-04-23 NOTE — PATIENT INSTRUCTIONS

## 2018-04-23 NOTE — PROGRESS NOTES
SLEEP CLINIC FOLLOW UP VISIT     Date of visit: 4/23/18    Purpose of visit: Review results of PSG date 4/13/18    HPI: Patient is a 23 y old male who presented to sleep clinic with concerns about snoring and witnessed apneas during sleep with non- restorative sleep, fatigue and excessive daytime sleepiness. A diagnostic polysomnogram was performed on 4/13/18, to evaluate for sleep apnea. He presents to sleep clinic today to review the test results.    PSG results:   Sleep Architecture: mild sleep fragmentation, reduced sleep efficiency, significantly reduced REM sleep.   The total recording time of the polysomnogram was 452.1 minutes. The total sleep time was 236.0 minutes. Sleep latency was normal at 12.3 minutes with the use of Ambien 5 mg. REM latency was 88.5 minutes. Arousal index was mildly increased at 22.4 arousals per hour. Sleep efficiency was decreased at 52.2%. Wake after sleep onset was 23.5 minutes. The patient spent 8.5% of total sleep time in Stage N1, 55.1% in Stage N2, 32.0% in Stage N3, and 4.4% in REM. Time in REM supine was 0 minutes.     Respiration: mild obstructive sleep apnea(ISABEL), pronounced during supine sleep position. Since REM sleep in supine position was not seen, it is plausible that the overall severity of the ISABEL may have been underestimated.     Events ? The polysomnogram revealed a presence of 6 obstructive, 2 central, and 0 mixed apneas resulting in an apnea index of 2.0 events per hour. There were 41 obstructive hypopneas and 0 central hypopneas resulting in an obstructive hypopnea index of 10.4 and central hypopnea index of 0 events per hour. The combined apnea/hypopnea index was 12.5 events per hour (central apnea/hypopnea index was 0.5 events per hour). The REM AHI was 0 events per hour. The supine AHI was 22.1 events per hour. The RERA index was 2.5 events per hour. The RDI was 15.0 events per hour.     Snoring - was reported as moderate and intermittent.     Respiratory  "rate and pattern - was notable for normal respiratory rate and pattern.     Sustained Sleep Associated Hypoventilation - Transcutaneous carbon dioxide monitoring was not used, however significant hypoventilation was not suggested by oximetry.     Sleep Associated Hypoxemia - (Greater than 5 minutes O2 sat at or below 88%) was not present. Baseline oxygen saturation was 97.2%. Lowest oxygen saturation was 92.0%. Time spent less than or equal to 88% was 0 minutes. Time spent less than or equal to 89% was 0 minutes.     Movement Activity: no significant PLMs; no abnormal sleep related behaviors     Periodic Limb Activity - There were 10 PLMs during the entire study. The PLM index was 2.5 movements per hour. The PLM Arousal Index was 1.3 per hour.     REM EMG Activity - Excessive transient/sustained muscle activity was not present.     Nocturnal Behavior - Abnormal sleep related behaviors were not noted during/arising out of NREM / REM sleep.     Bruxism - None apparent.     Cardiac Summary: occasional sinus bradycardia was noted.   The average pulse rate was 51.3 bpm. The minimum pulse rate was 36.9 bpm while the maximum pulse rate was 105.1 bpm. Occasional sinus bradycardia was noted.     The test results were discussed with pt in detail.      Current meds, Past medical history, Past surgical history, Allergies, Social history, Family history: reviewed, per EMR    Physical exam:  /81  Pulse 79  Resp 16  Ht 1.803 m (5' 11\")  Wt 85.7 kg (189 lb)  SpO2 96%  BMI 26.36 kg/m2  General appearance:  in no apparent distress  Pt is dressed casually, cooperative with good eye contact.   Speech is spontaneous with regular rate and volume.   Mood: euthymic; affect congruent with full range and intensity.   Sensorium: awake, alert and oriented to person, place, time, and situation.      Assessment/Plan:   1. Mild obstructive sleep apnea, pronounced during supine sleep position (non supine AHI was 4.2 per hour). Since " "REM sleep in supine position was not seen during the study, it is plausible that the overall severity of the ISABEL may have been underestimated. We discussed the options to treat ISABEL including positional therapy, oral appliance and CPAP. He was interested in starting treatment with CPAP device. He is leaving to [place] on 5/20/18,  for 6 months and knows the options of oral appliance is not practical. Prescription for Auto CPAP device has been generated with a range of 5 to 15 cmH2O. Recommend clinical follow up with sleep management team.  I discussed with the staff RT at [facility], , to check if the pt's insurance will need him to meet the compliance requirement, may be not practical in his case due to his international travel(in which case positional therapy using tenis ball t shirt would be a  feasible option). On the other hand if it isn't required,  it won't be a problem for him to obtain the device. Plan is to communicate with the patient once I hear back from [facility] about the next steps.  F/u plan will be based on the treatment plan.    2. Occasional sinus bradycardia was noted(likely since he is athletic). The average pulse rate was 51.3 bpm. The minimum pulse rate was 36.9 bpm while the maximum pulse rate was 105.1 bpm.  He reported a recent isolated episode of vasovagal syncope after drinking cold beverage quickly. Otherwise he is asymptomatic.  Recommended obtaining cardiology consultation for further evaluation.    Patient was counseled on  avoiding driving/operating heavy machinery, if drowsy or sleepy and to pull over if drowsy. We discussed eating healthy and exercising regularly.    The above note was dictated using voice recognition software. Although reviewed after completion, some word and grammatical error may remain . Please contact the author for any clarifications.    \"I spent a total of 25 minutes face to face with Yann Perales during today's office visit. Over 50% of this time was spent " "counseling the patient and  coordinating care regarding sleep apnea, treatment options, bradycardia.\"       Rubia Gomez MD   of Medicine,  Division of Pulmonary/Sleep Medicine  [facility].      "

## 2018-04-23 NOTE — NURSING NOTE
"Chief Complaint   Patient presents with     Study Results       Initial /81  Pulse 79  Resp 16  Ht 1.803 m (5' 11\")  Wt 85.7 kg (189 lb)  SpO2 96%  BMI 26.36 kg/m2 Estimated body mass index is 26.36 kg/(m^2) as calculated from the following:    Height as of this encounter: 1.803 m (5' 11\").    Weight as of this encounter: 85.7 kg (189 lb).  Medication Reconciliation: complete   Asia Best [facility] [place] [facility]      "

## 2018-04-24 ENCOUNTER — DOCUMENTATION ONLY (OUTPATIENT)
Dept: SLEEP MEDICINE | Facility: CLINIC | Age: 23
End: 2018-04-24
Payer: COMMERCIAL

## 2018-04-24 NOTE — PROGRESS NOTES
Patient was offered choice of vendor and chose [facility].  Patient Yann Perales was set up at [place] on April 24, 2018. Patient received a Resmed AirSense 10 Auto. Pressures were set at 5-15 cm H2O.   Patient s ramp is 5 cm H2O for Auto and FLEX/EPR is 2.  Patient received a Resmed Mask name: AIRFIT N20  Nasal mask Size Small, heated tubing and heated humidifier.  Patient is enrolled in the STM Program and does not need to meet compliance.

## 2018-04-27 ENCOUNTER — DOCUMENTATION ONLY (OUTPATIENT)
Dept: SLEEP MEDICINE | Facility: CLINIC | Age: 23
End: 2018-04-27
Payer: COMMERCIAL

## 2018-04-27 NOTE — PROGRESS NOTES
3 DAY STM VISIT    Diagnostic AHI: 12.5     Patient contacted for 3 day STM visit  Subjective measures:  Patient stated he is doing fine sleeping well with machine patient asked if his mask was leaking.      Device type: Auto-CPAP  PAP settings from order::  CPAP min 5 cm  H20       CPAP max 15 cm  H20  Device settings from machine      Min CPAP 5.0            Max CPAP 15.0      Assessment: Nightly usage, most nights over four hours. Patient mask leak within normal limits.   Action plan: Pt to have f/u 14 day STM visit.

## 2018-05-02 ENCOUNTER — APPOINTMENT (OUTPATIENT)
Dept: SLEEP MEDICINE | Facility: CLINIC | Age: 23
End: 2018-05-02
Payer: COMMERCIAL

## 2018-05-09 ENCOUNTER — DOCUMENTATION ONLY (OUTPATIENT)
Dept: SLEEP MEDICINE | Facility: CLINIC | Age: 23
End: 2018-05-09

## 2018-05-09 NOTE — PROGRESS NOTES
14 DAY STM VISIT    Diagnostic AHI: 12.5     Message left for patient to return call     Assessment: Pt meeting objective benchmarks.     Action plan: waiting for patient to return call.  and pt to have 30 day STM visit.    Device type: Auto-CPAP  PAP settings:  CPAP min 5 cm  H20      CPAP max 15 cm  H20     95th% pressure 9.5 cm   Objective measures: 14 day rolling measures      Compliance  92 %      Leak  6.72 lpm  last  upload      AHI 1.93   last  upload      Average number of minutes 452     Average hours of usage 7.5          Objective measure goal  Compliance   Goal >70%  Leak   Goal < 24 lpm  AHI  Goal < 5  Usage  Goal >240

## 2018-05-11 ENCOUNTER — VIRTUAL VISIT (OUTPATIENT)
Dept: SLEEP MEDICINE | Facility: CLINIC | Age: 23
End: 2018-05-11
Payer: COMMERCIAL

## 2018-05-11 DIAGNOSIS — G47.33 OSA ON CPAP: Primary | ICD-10-CM

## 2018-05-11 PROCEDURE — 99441 ZZC PHYSICIAN TELEPHONE EVALUATION 5-10 MIN: CPT | Performed by: INTERNAL MEDICINE

## 2018-05-11 NOTE — MR AVS SNAPSHOT
"              After Visit Summary   2018    Yann Perales    MRN: [id number]           Patient Information     Date Of Birth          1995        Visit Information        Provider Department      2018 2:30 PM Rubia Gomez MD [facility]        Today's Diagnoses     ISABEL on CPAP    -  1       Follow-ups after your visit        Follow-up notes from your care team     Return in 1 year (on 2019).      Who to contact     If you have questions or need follow up information about today's clinic visit or your schedule please contact [facility]EP [facility] directly at [phone].  Normal or non-critical lab and imaging results will be communicated to you by Audacioushart, letter or phone within 4 business days after the clinic has received the results. If you do not hear from us within 7 days, please contact the clinic through Audacioushart or phone. If you have a critical or abnormal lab result, we will notify you by phone as soon as possible.  Submit refill requests through [employer] or call your pharmacy and they will forward the refill request to us. Please allow 3 business days for your refill to be completed.          Additional Information About Your Visit        MyChart Information     [employer] lets you send messages to your doctor, view your test results, renew your prescriptions, schedule appointments and more. To sign up, go to [email or web address] . Click on \"Log in\" on the left side of the screen, which will take you to the Welcome page. Then click on \"Sign up Now\" on the right side of the page.     You will be asked to enter the access code listed below, as well as some personal information. Please follow the directions to create your username and password.     Your access code is: [email or web address]  Expires: 2018  3:15 PM     Your access code will  in 90 days. If you need help or a new code, please call your [place] clinic or [phone].      "   Care EveryWhere ID     This is your Care EveryWhere ID. This could be used by other organizations to access your [facility] medical records  [id number]         Blood Pressure from Last 3 Encounters:   04/23/18 132/81   04/18/18 134/64   04/09/18 114/78    Weight from Last 3 Encounters:   04/23/18 85.7 kg (189 lb)   04/18/18 88.5 kg (195 lb)   04/09/18 86.6 kg (191 lb)              We Performed the Following     Comprehensive DME        Primary Care Provider Fax #    [facility] [phone]       [address] [place] [id number]        Equal Access to Services     [facility] : Hadii aad darshana hadxi Pires, waaxda luqadaha, qaybta kaalmada hariyada, jane hudson . So [facility] [phone].    ATENCIÓN: Si habla español, tiene a shields disposición servicios gratuitos de asistencia lingüística. Llame al [phone].    We comply with applicable federal civil rights laws and [place] laws. We do not discriminate on the basis of race, color, national origin, age, disability, sex, sexual orientation, or gender identity.            Thank you!     Thank you for choosing [facility]EP [facility]  for your care. Our goal is always to provide you with excellent care. Hearing back from our patients is one way we can continue to improve our services. Please take a few minutes to complete the written survey that you may receive in the mail after your visit with us. Thank you!             Your Updated Medication List - Protect others around you: Learn how to safely use, store and throw away your medicines at www.disposemymeds.org.      Notice  As of 5/11/2018  3:40 PM    You have not been prescribed any medications.

## 2018-05-11 NOTE — Clinical Note
Mayur Escobar,   Please  increase the minimum pressure setting to 7 cm of water (orders in epic)and I request you to kindly call him early next week to check how he's doing with the revised settings.   Thanks, amador

## 2018-05-11 NOTE — PROGRESS NOTES
"SLEEP MEDICINE VIRTUAL VISIT NOTE    Date of visit: May 11, 2018    PURPOSE OF VISIT: f/u ISABEL/CPAP use     HPI: Mr. Perales  is a 23-year-old male who was recently diagnosed with mild obstructive sleep apnea (PSG 4/13/18 showed 12.5 events per hour).  He was prescribed auto titrating CPAP device with pressure settings between 5 to 15 cm of water.  Virtual visit has been scheduled today for follow-up of CPAP use.  He reports using the CPAP regularly during sleep. He denies any reports of snoring or awakenings to gasping for air or choking with the CPAP.  He recently had his mask exchanged and feels comfortable with his new mask. He reports slight air hunger when he first puts the mask on.  He reports good sleep quality with the device and denies fatigue or excessive daytime sleepiness. Based on the compliance measures ISABEL is well controlled with the CPAP of the current pressure settings. AHI rolling average 30 days was 1.9 per hour with excellent compliance.    Based on the reports of air hunger and the compliance data, recommended increasing the minimum pressure setting to 7 cm of water and he will be followed through the [facility] program. He is planning on taking the CPAP  with him when he travels to [place].  Recommended him to continue using the CPAP regularly during sleep and he was instructed to get the supplies for the device replaced regularly. He will follow up at the sleep clinic in April 2019 or sooner if any concerns.    The above note was dictated using voice recognition software. Although reviewed after completion, some word and grammatical error may remain . Please contact the author for any clarifications.    \"I spent a total of 10 minutes with Yann Perales during today's virtual visit. Over 50% of this time was spent counseling the patient and  coordinating care regarding sleep apnea, CPAP treatment\".    Rubia Gomez MD   of Medicine,  Division of Pulmonary/Sleep " [facility].

## 2018-05-25 ENCOUNTER — DOCUMENTATION ONLY (OUTPATIENT)
Dept: SLEEP MEDICINE | Facility: CLINIC | Age: 23
End: 2018-05-25

## 2018-05-25 NOTE — PROGRESS NOTES
30 DAY STM VISIT    Diagnostic AHI: 12.5       Message left for patient to return call     Assessment: Pt meeting objective benchmarks.     Action plan: waiting for patient to return call.   Patient had a phone visit with provider.  Device type: Auto-CPAP  PAP settings: CPAP min 7 cm  H20     CPAP max 15 cm  H20    95th% pressure 8.6 cm  H20   Mask type:  Nasal Mask  Objective measures: 14 day rolling measures      Compliance  71 %      Leak  3.17 lpm  last  upload      AHI 0.78   last  upload      Average number of minutes 386      Objective measure goal  Compliance   Goal >70%  Leak   Goal < 24 lpm  AHI  Goal < 5  Usage  Goal >240

## 2018-09-27 ENCOUNTER — OFFICE VISIT (OUTPATIENT)
Dept: SLEEP MEDICINE | Facility: CLINIC | Age: 23
End: 2018-09-27
Payer: COMMERCIAL

## 2018-09-27 VITALS
RESPIRATION RATE: 16 BRPM | SYSTOLIC BLOOD PRESSURE: 118 MMHG | HEIGHT: 71 IN | DIASTOLIC BLOOD PRESSURE: 80 MMHG | HEART RATE: 91 BPM | BODY MASS INDEX: 26.18 KG/M2 | OXYGEN SATURATION: 95 % | WEIGHT: 187 LBS

## 2018-09-27 DIAGNOSIS — G47.33 OSA (OBSTRUCTIVE SLEEP APNEA): Primary | ICD-10-CM

## 2018-09-27 DIAGNOSIS — R00.1 BRADYCARDIA: ICD-10-CM

## 2018-09-27 PROCEDURE — 99214 OFFICE O/P EST MOD 30 MIN: CPT | Performed by: INTERNAL MEDICINE

## 2018-09-27 NOTE — PATIENT INSTRUCTIONS
Call us when you are ready to do a home sleep test to evaluate treatment with the oral appliance.  Try to sleep on your sides  Could try a nasal spray like flonase  1.  Continue CPAP every night for all hours that you are sleeping.  If you nap use CPAP.  As always, try to get at least 8 hours of sleep or more each day, and avoid sleep deprivation. Avoid alcohol.    2.  Reasons that you might need a change to your pressure therapy would be weight gain or loss, waking having inadvertently removed your CPAP overnight, having previously felt refreshed by sleep with CPAP use and now waking un-refreshed, and return of daytime sleepiness. Also, the development of new medical problems can sometimes affect breathing at night-heart failure, stroke, medications such as narcotics.    3.  Please bring CPAP with you if you are hospitalized.  If anticipating surgery be sure to discuss with your surgeon that you have sleep apnea and use PAP therapy.      4.  Maintain your equipment as recommended which includes routine cleaning and replacement of supplies.  Call DME for any questions regarding supplies or maintenance.      5.  Do not drive on engage in potentially dangerous activities if feeling sleepy.        [facility] DME and cpap specialist [phone]  [facility] [phone]      [facility], [facility] [phone]        Tips for your CPAP and BIPAP use-    Mask fitting tips  Mask fitting exercise:    To improve your mask seal and your mobility at night, put mask on and secure in place.  Lie down in bed with full pressure and roll to one side, adjust headgear while in that position to eliminate any leaks. Repeat process rolling to other side.     The mask seal does not have to be perfect:   CPAP machines are designed to make up for small leaks. However, you will not tolerate leaks blowing in your eyes so you will need to adjust.   Any leak  should only be near or at the bottom of the mask.  We expect your mask to leak slightly at night.    Do not over-tighten the headgear straps, tighter IS NOT better, we expect minimal leak.    First try re-positioning the mask or headgear before tightening the headgear straps.  Mask leaks are expected due to changing sleeping positions. Try pulling the mask away from your skin allowing the cushion to re-inflate will minimize the leak.  If you struggle for a good fit, try turning the CPAP off and then readjust the mask by pulling it away from your face and then turning back on the CPAP.        Humidifier tips  Humidifiers can be adjusted to increase or decrease the amount of moisture according to your comfort level. You may need to adjust this frequently at first, but then might only change it with seasonal weather changes.     Try INCREASING the humidity if:  You experience a dry, irritated nasal passage or throat.  You have a runny, drippy nose or sneezing fits after using CPAP.  You experience nasal congestion during or after CPAP use.    Try DECREASING the humidity if:  You have excessive condensation or  rain out  in the tubing or mask.  Otherwise keep the tubing warm during the night by running it underneath the blankets or pillow.      Clinic visit after initial CPAP and BIPAP set-up   Bring your equipment with you to your 4 week follow up clinic visit.  We will be extracting your data from the machine.        Travel  Always take your equipment with you.  If you fly with your equipment bring it on with you as a carry on.  Medical equipment does not count as a carry on.    If you travel international the machines take 110-240.  The only adapter needed is the adapter that will fit into the receptacle (outlet).    You may also want to bring an extension cord as many hotel rooms have limited outlets at the bedside.  Do not travel with water in your humidifier chamber.     Cleaning and Maintenance  Guidelines    Equipment Frequency Cleaning Method   Mask First Day    Daily      Weekly Soak mask in hot soapy water for 30 minutes, rinse and air dry.  Wipe nasal cushion with a hot soapy (Ivory, baby shampoo) cloth and rinse.  Baby wipes may also be used.  Do not use anti-bacterial soaps,Nicolette  liquid soap, rubbing alcohol, bleach or ammonia.  Wash frame in hot soapy water (Ivory, baby shampoo) rinse and let air dry   Headgear Biweekly Wash in hot soapy water, rinse and air dry   Reusable Gray Filter Weekly Wash in hot soapy water, rinse, put in towel squeeze moisture out, let air dry   Disposable White Filter Check Weekly Replace when brown or gray in color; at least every 2 to 3 months   Humidifier Chamber Daily    Weekly Empty distilled water from humidifier and let air dry    Hand wash in hot soapy water, rinse and air dry   Tubing Weekly Wash in hot soapy water, rinse and let air dry   Mask, Tubing and Humidifier Chamber As needed Disinfect: Soak in 1 part vinegar to 3 parts hot water for 30 minutes, rinse well and air dry  Not the material headgear        MASK AND SUPPLY REORDERING  Reminder: Most insurance companies will allow for a new mask, headgear, tubing, and reusable gray filter every six months.  Disposable white ultra-fine filters are covered monthly.    EQUIPMENT NEEDS  Please call our CPAP specialist with any equipment problems, questions or needs.    HOME AND SAFETY INSTRUCTIONS    Do not use frayed or cracked electrical cords, multi plug adaptors, or switched receptacles    Do not immerse electrical equipment into water    Assure that electrical cords do not become a tripping hazard    Your BMI is Body mass index is 26.08 kg/(m^2).  Weight management is a personal decision.  If you are interested in exploring weight loss strategies, the following discussion covers the approaches that may be successful. Body mass index (BMI) is one way to tell whether you are at a healthy weight, overweight, or  obese. It measures your weight in relation to your height.  A BMI of 18.5 to 24.9 is in the healthy range. A person with a BMI of 25 to 29.9 is considered overweight, and someone with a BMI of 30 or greater is considered obese. More than two-thirds of American adults are considered overweight or obese.  Being overweight or obese increases the risk for further weight gain. Excess weight may lead to heart disease and diabetes.  Creating and following plans for healthy eating and physical activity may help you improve your health.  Weight control is part of healthy lifestyle and includes exercise, emotional health, and healthy eating habits. Careful eating habits lifelong are the mainstay of weight control. Though there are significant health benefits from weight loss, long-term weight loss with diet alone may be very difficult to achieve- studies show long-term success with dietary management in less than 10% of people. Attaining a healthy weight may be especially difficult to achieve in those with severe obesity. In some cases, medications, devices and surgical management might be considered.  What can you do?  If you are overweight or obese and are interested in methods for weight loss, you should discuss this with your provider.     Consider reducing daily calorie intake by 500 calories.     Keep a food journal.     Avoiding skipping meals, consider cutting portions instead.    Diet combined with exercise helps maintain muscle while optimizing fat loss. Strength training is particularly important for building and maintaining muscle mass. Exercise helps reduce stress, increase energy, and improves fitness. Increasing exercise without diet control, however, may not burn enough calories to loose weight.       Start walking three days a week 10-20 minutes at a time    Work towards walking thirty minutes five days a week     Eventually, increase the speed of your walking for 1-2 minutes at time    In addition, we  recommend that you review healthy lifestyles and methods for weight loss available through the National Institutes of Health patient information sites:  http://win.niddk.nih.gov/publications/index.htm    And look into health and wellness programs that may be available through your health insurance provider, employer, local community center, or braulio club.    Weight management plan: Patient was referred to their PCP to discuss a diet and exercise plan.

## 2018-09-27 NOTE — PROGRESS NOTES
Chief complaint: Follow-up of obstructive sleep apnea, would like to discuss alternative treatments    History of Present Illness: 23-year-old gentleman seen in this clinic in April 2018 by Dr. Gomez.  At that time he complained of witnessed apneas and poor quality sleep.  He reported a [place] of around 12.  He underwent polysomnography that showed mild obstructive sleep apnea and was initiated on CPAP therapy.  He is here today reporting that he is not sure if he is getting much benefit from the CPAP and he has a hard time remaining compliant.  He took it with him to [place] but does not like wearing it.  He is wondering about alternatives.  He does not report a significant improvement in daytime symptoms since using the Pap therapy.  He notices occasional nasal congestion with oral breathing and is also asking about different types of masks.      He also asks about his sleep study report which showed low heart rates.  Upon further questioning there is no family history of arrhythmias sudden death or drowning.  Patient himself has had syncopal episode however (4/2018 ED visit).  None while driving.  He denies chest pain or palpitations.  Is not taking any over-the-counter medications or prescribed medications.  He is going to school for [employer] science at the [employer].  He denies any new sleep concerns or any new major medical problems.    I pulled up the patient's original sleep study data and showed him different portions of the sleep study where the heart rate was consistently in the 40s dipping down into the upper 30s.  These were not associated with obstructive apneas.  During periods of wakefulness his heart rate did respond appropriately going into the 60s and 70s.  See below for screenshot.    [place] Seepiness Scale:11 (Less than 10 normal)    Past Medical History:   Diagnosis Date     ISABEL (obstructive sleep apnea) Mild 9/27/2018    PSG at [facility] 4/13/2018 wt 191 AHI 12.5, supine predominant  "      No Known Allergies    No current outpatient prescriptions on file.     No current facility-administered medications for this visit.        Social History     Social History     Marital status: Single     Spouse name: N/A     Number of children: N/A     Years of education: N/A     Occupational History     Not on file.     Social History Main Topics     Smoking status: Never Smoker     Smokeless tobacco: Never Used     Alcohol use Yes      Comment: very occasionally     Drug use: No     Sexual activity: Not on file     Other Topics Concern     Not on file     Social History Narrative         EXAM: Alert, pleasant, no distress  /80  Pulse 91  Resp 16  Ht 1.803 m (5' 11\")  Wt 84.8 kg (187 lb)  SpO2 95%  BMI 26.08 kg/m2  Psychiatric: Mood and affect appear normal    PSG date:4/13/2018  Wt:191  AHI: 12.5   Bradycardia  Not at all associated with obstructive apneas      PAP download from 8/28/2018 to  9/26/2018 reviewed:  Per cent of days used greater than 4 Hours 47 % (minimum goal greater than 70%)  Average use on days used: 6 hours 44 min  Settings: Min EPAP 7 cmH2O    Max EPAP 15 cmH2O  Pressures delivered 90/95th percentile for pressure 8.8 cmH2O  Average AHI 1.4 events per hour (goal less than 5)  Leak acceptable    TSH normal 4/2018  12L EKG rate 54 sinus bradycardia wake    ASSESSMENT: 23-year-old gentleman with mild supine predominant obstructive sleep apnea and daytime fatigue and sleepiness.  He reports adequate sleep times.  Not getting significant benefit from CPAP at this time also not using at goal.  It is possible he may do better with an oral appliance however is also possible that his daytime symptoms are not related to this mild sleep apnea.  Bradycardia noted on the PSG appears to be sinus and is likely nonpathologic however patient has multiple questions regarding this.  They are clearly not associated with his obstructive events.  Given his symptoms of fatigue will consider further " consultation with cardiology.    PLAN: Extensive discussion today regarding treatment options of mild sleep apnea including position restriction which she is tried as well as oral appliances.  He is interested in oral appliance one was demonstrated to him today.  Referral generated.  In the meantime he can continue to use CPAP.  Prescription generated for supplies mask fitting to try other masks.  If he proceeds with the oral appliance recommend home sleep apnea testing in 4 months or so when he and his sleep dentist agree that the timing is right to reevaluate sleep apnea with the oral appliance in place.  Referral generated for cardiology/EP evaluation for bradycardia may or may not be the etiology of his previous fainting.  Patient is agreeable with this plan please see patient instructions for further details of some of the counseling provided.  He may benefit from improved treatment of nasal congestion.    Twenty-five minutes spent with patient, >50% spent counseling and coordinating care.      Светлана Walls M.D.  Pulmonary/Critical Care/Sleep Medicine    The above note was dictated using voice recognition software and may include typographical errors. Please contact the author for any clarifications.

## 2018-09-27 NOTE — MR AVS SNAPSHOT
After Visit Summary   9/27/2018    Yann Perales    MRN: [id number]           Patient Information     Date Of Birth          1995        Visit Information        Provider Department      9/27/2018 2:30 PM Светлана Walls MD [facility]        Today's Diagnoses     ISABEL (obstructive sleep apnea) Mild    -  1    Bradycardia          Care Instructions    Call us when you are ready to do a home sleep test to evaluate treatment with the oral appliance.  Try to sleep on your sides  Could try a nasal spray like flonase  1.  Continue CPAP every night for all hours that you are sleeping.  If you nap use CPAP.  As always, try to get at least 8 hours of sleep or more each day, and avoid sleep deprivation. Avoid alcohol.    2.  Reasons that you might need a change to your pressure therapy would be weight gain or loss, waking having inadvertently removed your CPAP overnight, having previously felt refreshed by sleep with CPAP use and now waking un-refreshed, and return of daytime sleepiness. Also, the development of new medical problems can sometimes affect breathing at night-heart failure, stroke, medications such as narcotics.    3.  Please bring CPAP with you if you are hospitalized.  If anticipating surgery be sure to discuss with your surgeon that you have sleep apnea and use PAP therapy.      4.  Maintain your equipment as recommended which includes routine cleaning and replacement of supplies.  Call DME for any questions regarding supplies or maintenance.      5.  Do not drive on engage in potentially dangerous activities if feeling sleepy.        [facility] DME and cpap specialist [phone]  [facility] [phone]      [facility], [facility] [phone]        Tips for your CPAP and BIPAP use-    Mask fitting tips  Mask fitting exercise:    To improve your mask seal and your mobility at  night, put mask on and secure in place.  Lie down in bed with full pressure and roll to one side, adjust headgear while in that position to eliminate any leaks. Repeat process rolling to other side.     The mask seal does not have to be perfect:   CPAP machines are designed to make up for small leaks. However, you will not tolerate leaks blowing in your eyes so you will need to adjust.   Any leak should only be near or at the bottom of the mask.  We expect your mask to leak slightly at night.    Do not over-tighten the headgear straps, tighter IS NOT better, we expect minimal leak.    First try re-positioning the mask or headgear before tightening the headgear straps.  Mask leaks are expected due to changing sleeping positions. Try pulling the mask away from your skin allowing the cushion to re-inflate will minimize the leak.  If you struggle for a good fit, try turning the CPAP off and then readjust the mask by pulling it away from your face and then turning back on the CPAP.        Humidifier tips  Humidifiers can be adjusted to increase or decrease the amount of moisture according to your comfort level. You may need to adjust this frequently at first, but then might only change it with seasonal weather changes.     Try INCREASING the humidity if:  You experience a dry, irritated nasal passage or throat.  You have a runny, drippy nose or sneezing fits after using CPAP.  You experience nasal congestion during or after CPAP use.    Try DECREASING the humidity if:  You have excessive condensation or  rain out  in the tubing or mask.  Otherwise keep the tubing warm during the night by running it underneath the blankets or pillow.      Clinic visit after initial CPAP and BIPAP set-up   Bring your equipment with you to your 4 week follow up clinic visit.  We will be extracting your data from the machine.        Travel  Always take your equipment with you.  If you fly with your equipment bring it on with you as a carry  on.  Medical equipment does not count as a carry on.    If you travel international the machines take 110-240.  The only adapter needed is the adapter that will fit into the receptacle (outlet).    You may also want to bring an extension cord as many hotel rooms have limited outlets at the bedside.  Do not travel with water in your humidifier chamber.     Cleaning and Maintenance Guidelines    Equipment Frequency Cleaning Method   Mask First Day    Daily      Weekly Soak mask in hot soapy water for 30 minutes, rinse and air dry.  Wipe nasal cushion with a hot soapy (Ivory, baby shampoo) cloth and rinse.  Baby wipes may also be used.  Do not use anti-bacterial soaps,Nicolette  liquid soap, rubbing alcohol, bleach or ammonia.  Wash frame in hot soapy water (Ivory, baby shampoo) rinse and let air dry   Headgear Biweekly Wash in hot soapy water, rinse and air dry   Reusable Gray Filter Weekly Wash in hot soapy water, rinse, put in towel squeeze moisture out, let air dry   Disposable White Filter Check Weekly Replace when brown or gray in color; at least every 2 to 3 months   Humidifier Chamber Daily    Weekly Empty distilled water from humidifier and let air dry    Hand wash in hot soapy water, rinse and air dry   Tubing Weekly Wash in hot soapy water, rinse and let air dry   Mask, Tubing and Humidifier Chamber As needed Disinfect: Soak in 1 part vinegar to 3 parts hot water for 30 minutes, rinse well and air dry  Not the material headgear        MASK AND SUPPLY REORDERING  Reminder: Most insurance companies will allow for a new mask, headgear, tubing, and reusable gray filter every six months.  Disposable white ultra-fine filters are covered monthly.    EQUIPMENT NEEDS  Please call our CPAP specialist with any equipment problems, questions or needs.    HOME AND SAFETY INSTRUCTIONS    Do not use frayed or cracked electrical cords, multi plug adaptors, or switched receptacles    Do not immerse electrical equipment into  water    Assure that electrical cords do not become a tripping hazard    Your BMI is Body mass index is 26.08 kg/(m^2).  Weight management is a personal decision.  If you are interested in exploring weight loss strategies, the following discussion covers the approaches that may be successful. Body mass index (BMI) is one way to tell whether you are at a healthy weight, overweight, or obese. It measures your weight in relation to your height.  A BMI of 18.5 to 24.9 is in the healthy range. A person with a BMI of 25 to 29.9 is considered overweight, and someone with a BMI of 30 or greater is considered obese. More than two-thirds of American adults are considered overweight or obese.  Being overweight or obese increases the risk for further weight gain. Excess weight may lead to heart disease and diabetes.  Creating and following plans for healthy eating and physical activity may help you improve your health.  Weight control is part of healthy lifestyle and includes exercise, emotional health, and healthy eating habits. Careful eating habits lifelong are the mainstay of weight control. Though there are significant health benefits from weight loss, long-term weight loss with diet alone may be very difficult to achieve- studies show long-term success with dietary management in less than 10% of people. Attaining a healthy weight may be especially difficult to achieve in those with severe obesity. In some cases, medications, devices and surgical management might be considered.  What can you do?  If you are overweight or obese and are interested in methods for weight loss, you should discuss this with your provider.     Consider reducing daily calorie intake by 500 calories.     Keep a food journal.     Avoiding skipping meals, consider cutting portions instead.    Diet combined with exercise helps maintain muscle while optimizing fat loss. Strength training is particularly important for building and maintaining muscle  mass. Exercise helps reduce stress, increase energy, and improves fitness. Increasing exercise without diet control, however, may not burn enough calories to loose weight.       Start walking three days a week 10-20 minutes at a time    Work towards walking thirty minutes five days a week     Eventually, increase the speed of your walking for 1-2 minutes at time    In addition, we recommend that you review healthy lifestyles and methods for weight loss available through the National Institutes of Health patient information sites:  http://win.niddk.nih.gov/publications/index.htm    And look into health and wellness programs that may be available through your health insurance provider, employer, local community center, or braulio club.    Weight management plan: Patient was referred to their PCP to discuss a diet and exercise plan.              Follow-ups after your visit        Additional Services     Referral to Cardiology (Adult Patient)       General cardiology or Electrophysiology  Preferred location:  [facility] [phone]   [email or web address]    Please be aware that coverage of these services is subject to the terms and limitations of your health insurance plan.  Call member services at your health plan with any benefit or coverage questions.      Please bring the following to your appointment:  Any x-rays, CTs or MRIs which have been performed. Contact the facility where they were done to arrange for  prior to your scheduled appointment.    List of current medications  This referral request   Any documents/labs given to you for this referral            SLEEP DENTAL REFERRAL       Dental appliance resources recommended by [facility]     Below is a list of dental appliance resources recommended by [facility]   If you wish to choose your own dental sleep dentist, you may identify a provider close to  you: [email or web address]    [facility] Dental   Sleep Medicine [facility]   Gonzales Moss DDS, MS   [facility]  [address] [place] [address] [address]  [place] [address]   Appointments: [phone]  Fax: [phone]   Email: [email or web address]   [facility] Clinic   Noé Holbrook, VIPUL Johnson DDS   [address]   [place] [address]   Appointments: [phone]   Website: [email or web address]    [facility]oring and Sleep Apnea   [facility] Center   KALEY Garcia DDS  [address]   [place] [address]   Appointments: [phone]   Fax: [phone]   Email: [email or web address]  Website: [employer]     [facility]   Office 1   Raudel Moreno DDS - [DME Medicare]  [address]   [place] [address]  Appointments: [phone]  Fax: [phone]  Website: [employer]    [facility]   Office 2  Raudel Moreno DDS - [DME Medicare]  [address]  [place] [address]  Appointments: [phone]  Fax: [phone]  Website: [employer]    [facility]   [facility]  Mohan Rubin DDS  [address] [place] [address]  Appointments: [phone]    [facility]   [place] Office   Mendez Johnson DDS   [employer]   [address]   [place] [address]   Appointments: [phone]   Fax: [phone]   Website: [employer]     [facility]   [place] Office   Lambert Monroe DDS, MS - [DME Medicare]  [address]   [place] [address]   Appointments: [phone]   Fax: [phone]   Website: [employer]     [facility]  Tito Jones DMD, MSD - [DME Medicare]  [address]  [place] [address]  Appointments [phone]  Fax: [phone]  Website: [employer]    [facility]   [place] Office   Maria A Huff DDS, PhD, MS   [facility]   [address]   [place] [address]   Appointments: [phone]   Fax: [phone]   Website: [email or web address]     [facility]   [place] Office   Maria A Huff DDS, PhD, MS   [facility]   [address]   [place] [address]   Appointments: [phone]   Fax: [phone]   Website: [email or web address]     [facility]  Mi Brown DDS  [facility]  [address] [place] [address]  Appointments: [phone]   Fax: [phone]    If you wish to choose your own dental sleep dentist, you may identify a provider close to you: [email or web address]      AHI: 12.5 PSG DATE: 4/13/2018     Return to clinic in 4 for Home Sleep Test to confirm adequacy of Treatment.    Other information regarding this referral: Mandibular repositioning appliance for ISABEL. If your insurance asks for a CPT code, it is .    Please be aware that coverage of these services is subject to the terms and limitations of your health insurance plan.  Call member services at your health plan with any benefit or coverage questions.      Please bring the following to your appointment:    >>   List of current medications   >>   This referral request   >>   Any documents/labs given to you for this referral                  Who to contact     If you have questions or need follow up information about today's clinic visit or your schedule please contact [facility] directly at [phone].  Normal or non-critical lab and imaging results will be communicated to you by MyChart, letter or phone within 4 business days after the clinic has received  "the results. If you do not hear from us within 7 days, please contact the clinic through [employer] or phone. If you have a critical or abnormal lab result, we will notify you by phone as soon as possible.  Submit refill requests through [employer] or call your pharmacy and they will forward the refill request to us. Please allow 3 business days for your refill to be completed.          Additional Information About Your Visit        [employer] Information     [employer] lets you send messages to your doctor, view your test results, renew your prescriptions, schedule appointments and more. To sign up, go to [email or web address] . Click on \"Log in\" on the left side of the screen, which will take you to the Welcome page. Then click on \"Sign up Now\" on the right side of the page.     You will be asked to enter the access code listed below, as well as some personal information. Please follow the directions to create your username and password.     Your access code is: [email or web address]  Expires: 2018  3:43 PM     Your access code will  in 90 days. If you need help or a new code, please call your [facility] clinic or [phone].        Care EveryWhere ID     This is your Care EveryWhere ID. This could be used by other organizations to access your [facility] medical records  [id number]        Your Vitals Were     Pulse Respirations Height Pulse Oximetry BMI (Body Mass Index)       91 16 1.803 m (5' 11\") 95% 26.08 kg/m2        Blood Pressure from Last 3 Encounters:   18 118/80   18 132/81   18 134/64    Weight from Last 3 Encounters:   18 84.8 kg (187 lb)   18 85.7 kg (189 lb)   18 88.5 kg (195 lb)              We Performed the Following     Comprehensive DME     Referral to Cardiology (Adult Patient)     SLEEP DENTAL REFERRAL        Primary Care Provider Fax #    [facility] [phone]       [address] [place] [address]        Equal Access to Services     [facility] " GAAR : Hadii aad ku hadclaudiafani Lexis, wapromiseda luqadaha, qaybta kakendallmagda noriegarosamagda, jane nashvelkarl avendano. So [facility] [phone].    ATENCIÓN: Si habla español, tiene a shields disposición servicios gratuitos de asistencia lingüística. Llame al [phone].    We comply with applicable federal civil rights laws and [place] laws. We do not discriminate on the basis of race, color, national origin, age, disability, sex, sexual orientation, or gender identity.            Thank you!     Thank you for choosing [facility]  for your care. Our goal is always to provide you with excellent care. Hearing back from our patients is one way we can continue to improve our services. Please take a few minutes to complete the written survey that you may receive in the mail after your visit with us. Thank you!             Your Updated Medication List - Protect others around you: Learn how to safely use, store and throw away your medicines at www.disposemymeds.org.      Notice  As of 9/27/2018  3:43 PM    You have not been prescribed any medications.

## 2018-09-28 ENCOUNTER — TELEPHONE (OUTPATIENT)
Dept: SLEEP MEDICINE | Facility: CLINIC | Age: 23
End: 2018-09-28

## 2018-09-28 NOTE — TELEPHONE ENCOUNTER
I called patient today to see if patient would like to come in for a mask fitting and I let him know Dr. Walls put in an order for the mask fitting. No answer left message for patient to call me back here in [place].

## 2018-10-15 ENCOUNTER — TELEPHONE (OUTPATIENT)
Dept: SLEEP MEDICINE | Facility: CLINIC | Age: 23
End: 2018-10-15

## 2018-10-15 NOTE — TELEPHONE ENCOUNTER
I called patient today 10/15/18 at 9:18 am to follow with patient if he would like to schedule a mask fitting appointment. Per patient he would like to but busy right now and will call back to schedule.

## 2018-10-24 ENCOUNTER — DOCUMENTATION ONLY (OUTPATIENT)
Dept: SLEEP MEDICINE | Facility: CLINIC | Age: 23
End: 2018-10-24

## 2018-10-24 NOTE — PROGRESS NOTES
6 month [facility] Recheck Visit     Diagnostic AHI: 12.5 PSG    Data only recheck     Assessment: Pt meeting objective benchmarks.     Action plan:   pt to follow up per provider request (1-2 yrs)    Device type: Auto-CPAP  PAP settings: CPAP min 7.0 cm  H20     CPAP max 15.0 cm  H20    95th% pressure 8.7 cm  H20   Objective measures: 14 day rolling measures      Compliance  71 %      Leak  2.03 lpm  last  upload      AHI 0.87   last  upload      Average number of minutes 380      Objective measure goal  Compliance   Goal >70%  Leak   Goal < 24 lpm  AHI  Goal < 5  Usage  Goal >240

## 2018-11-30 ENCOUNTER — DOCUMENTATION ONLY (OUTPATIENT)
Dept: SLEEP MEDICINE | Facility: CLINIC | Age: 23
End: 2018-11-30
Payer: COMMERCIAL

## 2018-11-30 NOTE — PROGRESS NOTES
CHELA CAME INTO THE [facility] FOR A MASK FITTING, HE LIKES HIS DREAMWEAR NASAL BUT WANTS TO SWITCH TO AN OVER THE NOSE HE HAD TRIED ON THE N20 MED AND DREAMWEAR NASAL MED IN THE PAST, HE SAID HE ROLLS A LOT SO HE WANTS SOMETHING THAT WILL STAY ON, TRIED ON THE ESON 2 IN MED, HE LIKED THE MASK STATED IT WAS COMFORTABLE, PT IS AWARE OF 30 DAY MASK EXCHANGE AND WILL CALL ME WITH ANY QUESTIONS.

## 2019-04-10 ENCOUNTER — ANCILLARY PROCEDURE (OUTPATIENT)
Dept: GENERAL RADIOLOGY | Facility: CLINIC | Age: 24
End: 2019-04-10
Attending: FAMILY MEDICINE
Payer: COMMERCIAL

## 2019-04-10 ENCOUNTER — OFFICE VISIT (OUTPATIENT)
Dept: ORTHOPEDICS | Facility: CLINIC | Age: 24
End: 2019-04-10
Payer: COMMERCIAL

## 2019-04-10 VITALS — RESPIRATION RATE: 16 BRPM | HEART RATE: 56 BPM | SYSTOLIC BLOOD PRESSURE: 125 MMHG | DIASTOLIC BLOOD PRESSURE: 71 MMHG

## 2019-04-10 DIAGNOSIS — M25.571 PAIN IN JOINT INVOLVING RIGHT ANKLE AND FOOT: ICD-10-CM

## 2019-04-10 DIAGNOSIS — M25.532 WRIST PAIN, ACUTE, LEFT: ICD-10-CM

## 2019-04-10 DIAGNOSIS — M25.532 WRIST PAIN, ACUTE, LEFT: Primary | ICD-10-CM

## 2019-04-10 NOTE — PROGRESS NOTES
Subjective:   Yann Perales is a 24 year old male who presents with right ankle pain. The patient reports that while playing racquetball, he landed on his heel and his ankle went into platerflexion. He felt a pop in the back of his ankle. He reports feeling a pop in the morning and at night. He also describes pain in volar left wrist that has been more painful than his ankle. He is a student at the .  Playing raquectball twice a week.  Lunged with right foot, heard a pop.  Can walk fine.  Hx of sprained ankle.  Gets a pop, slight pain just walking around.  Staying the same.  Hurt the most the first day.    Pressure on the fingers, in push-up gets volar wrist pain.  Doesn't recall any injury now or in the past.  Doing yoga and dips, biking daily  Straight handle bar.    Background:   Date of injury: 4/4/19   Duration of symptoms: 6 days for ankle; months for wrist  Mechanism of Injury: Acute; Activity Related Racquetball  Intensity: Mild pain for ankle  Aggravating factors: Walking and running. Handle bar of bike, hyper extension for wrist   Relieving Factors: Nothing has been tried  Prior Evaluation: None    PAST MEDICAL, SOCIAL, SURGICAL AND FAMILY HISTORY: He  has a past medical history of ISABEL (obstructive sleep apnea) Mild (9/27/2018).  He  has no past surgical history on file.  His family history is not on file.  He reports that he has never smoked. He has never used smokeless tobacco. He reports that he drinks alcohol. He reports that he does not use drugs.    ALLERGIES: He has No Known Allergies.    CURRENT MEDICATIONS: He currently has no medications in their medication list.     REVIEW OF SYSTEMS: 10 point review of systems is negative except as noted above.     Exam:   /71 (BP Location: Right arm, Patient Position: Sitting, Cuff Size: Adult Large)   Pulse 56   Resp 16            CONSTITUTIONAL: healthy, alert, no distress and cooperative  HEAD: Normocephalic. No masses, lesions, tenderness or  Counseled on diet modification and exercise.   abnormalities  SKIN: no suspicious lesions or rashes  GAIT: antalgic  NEUROLOGIC: Non-focal  PSYCHIATRIC: affect normal/bright and mentation appears normal.    MUSCULOSKELETAL: right ankle pain and left wrist pain  WRIST:  Inspection: no swelling  no effusion  Skin:no ecchymosis  Palpation: Tender: volar wrist  Non-tender: distal radius, distal ulna, 1st dorsal compartment, scaphoid, lunate  Range of Motion: normal  Strength: no deficits  Special tests: negative Tinel's at carpal tunnel.  , negative Phalen's.  , radial, ulnar and median nerve function intact    ELBOW:  elbow exam not done    ANKLE  Inspection/Palpation:     Swelling: no swelling   Tender: mild posterior calf, Achilles mild tenderness without palpable defect     Non-tender: ATFL, CFL, PTFL, medial malleolus, deltoid ligament, anterior tib-fib ligament, achilles  tendon, no achilles tendon defect   Range of Motion: dorsiflexion: full, plantarflexion: full, inversion: full, eversion: full  Strength:dorsiflexion: 5/5, plantarflexion: 5/5, inversion: 5/5, eversion: 5/5   Special tests: negative anterior drawer, negative varus stress, negative valgus stress, negative forced external rotation, negative Kapoor sign     FOOT  Inspection/Palpation:      Swelling: no swelling     Non-tender: promixal 5th metatarsal, 1st, 2nd, 3rd, 4th, 5th metatarsals, calcaneous, cuboid,  navicular, cuneiform lateral, cuneiform middle, cuneiform medial, metatarsal heads, peroneal tendon: at lateral malleolus, at cuboid, at proximal 5th metatarsal, posterior tibial tendon at medial malleolus, posterior tibial tendon at navicular, plantar fascia  Range of Motion: flexion of toes: full, extension of toes: full         Assessment/Plan:   Pt is a 23 yo male with PMHx of mild ISABEL presenting with left wrist and right ankle pain    1. Left wrist tendonitis- volar, offered hand therapy and pt declines  2. Right ankle pain- Achilles tenderness  Continue to use the wrist and ankle as  tolerated. Ice and gentle ROM, strengthening exercises   Could do formal hand therapy if wrist isn't improving    RTC prn  X-RAY INTERPRETATION:   X-Ray of the Left Wrist: 3-view, ap, lateral, oblique  ordered and interpreted in the office today was negative for fracture, subluxation or joint space abnormality.  X-Ray of the Right Ankle: 3-view, ap, lateral, mortise   ordered and interpreted in the office today was negative for fracture, subluxation or joint space abnormality.

## 2019-04-10 NOTE — LETTER
4/10/2019      RE: Yann Perales  [address]  [place] [address]        Subjective:   Yann Perales is a 24 year old male who presents with right ankle pain. The patient reports that while playing racquetball, he landed on his heel and his ankle went into platerflexion. He felt a pop in the back of his ankle. He reports feeling a pop in the morning and at night. He also describes pain in volar left wrist that has been more painful than his ankle. He is a student at the .  Playing raquectball twice a week.  Lunged with right foot, heard a pop.  Can walk fine.  Hx of sprained ankle.  Gets a pop, slight pain just walking around.  Staying the same.  Hurt the most the first day.    Pressure on the fingers, in push-up gets volar wrist pain.  Doesn't recall any injury now or in the past.  Doing yoga and dips, biking daily  Straight handle bar.    Background:   Date of injury: 4/4/19   Duration of symptoms: 6 days for ankle; months for wrist  Mechanism of Injury: Acute; Activity Related Racquetball  Intensity: Mild pain for ankle  Aggravating factors: Walking and running. Handle bar of bike, hyper extension for wrist   Relieving Factors: Nothing has been tried  Prior Evaluation: None    PAST MEDICAL, SOCIAL, SURGICAL AND FAMILY HISTORY: He  has a past medical history of ISABEL (obstructive sleep apnea) Mild (9/27/2018).  He  has no past surgical history on file.  His family history is not on file.  He reports that he has never smoked. He has never used smokeless tobacco. He reports that he drinks alcohol. He reports that he does not use drugs.    ALLERGIES: He has No Known Allergies.    CURRENT MEDICATIONS: He currently has no medications in their medication list.     REVIEW OF SYSTEMS: 10 point review of systems is negative except as noted above.     Exam:   /71 (BP Location: Right arm, Patient Position: Sitting, Cuff Size: Adult Large)   Pulse 56   Resp 16            CONSTITUTIONAL: healthy, alert, no  distress and cooperative  HEAD: Normocephalic. No masses, lesions, tenderness or abnormalities  SKIN: no suspicious lesions or rashes  GAIT: antalgic  NEUROLOGIC: Non-focal  PSYCHIATRIC: affect normal/bright and mentation appears normal.    MUSCULOSKELETAL: right ankle pain and left wrist pain  WRIST:  Inspection: no swelling  no effusion  Skin:no ecchymosis  Palpation: Tender: volar wrist  Non-tender: distal radius, distal ulna, 1st dorsal compartment, scaphoid, lunate  Range of Motion: normal  Strength: no deficits  Special tests: negative Tinel's at carpal tunnel.  , negative Phalen's.  , radial, ulnar and median nerve function intact    ELBOW:  elbow exam not done    ANKLE  Inspection/Palpation:     Swelling: no swelling   Tender: mild posterior calf, Achilles mild tenderness without palpable defect     Non-tender: ATFL, CFL, PTFL, medial malleolus, deltoid ligament, anterior tib-fib ligament, achilles  tendon, no achilles tendon defect   Range of Motion: dorsiflexion: full, plantarflexion: full, inversion: full, eversion: full  Strength:dorsiflexion: 5/5, plantarflexion: 5/5, inversion: 5/5, eversion: 5/5   Special tests: negative anterior drawer, negative varus stress, negative valgus stress, negative forced external rotation, negative Kapoor sign     FOOT  Inspection/Palpation:      Swelling: no swelling     Non-tender: promixal 5th metatarsal, 1st, 2nd, 3rd, 4th, 5th metatarsals, calcaneous, cuboid,  navicular, cuneiform lateral, cuneiform middle, cuneiform medial, metatarsal heads, peroneal tendon: at lateral malleolus, at cuboid, at proximal 5th metatarsal, posterior tibial tendon at medial malleolus, posterior tibial tendon at navicular, plantar fascia  Range of Motion: flexion of toes: full, extension of toes: full         Assessment/Plan:   Pt is a 23 yo male with PMHx of mild ISABEL presenting with left wrist and right ankle pain    1. Left wrist tendonitis- volar, offered hand therapy and pt  declines  2. Right ankle pain- Achilles tenderness  Continue to use the wrist and ankle as tolerated. Ice and gentle ROM, strengthening exercises   Could do formal hand therapy if wrist isn't improving    RTC prn  X-RAY INTERPRETATION:   X-Ray of the Left Wrist: 3-view, ap, lateral, oblique  ordered and interpreted in the office today was negative for fracture, subluxation or joint space abnormality.  X-Ray of the Right Ankle: 3-view, ap, lateral, mortise   ordered and interpreted in the office today was negative for fracture, subluxation or joint space abnormality.    Kaleigh Noe MD

## 2019-05-10 ENCOUNTER — ANCILLARY PROCEDURE (OUTPATIENT)
Dept: CT IMAGING | Facility: CLINIC | Age: 24
End: 2019-05-10
Attending: PODIATRIST
Payer: COMMERCIAL

## 2019-05-10 ENCOUNTER — ANCILLARY PROCEDURE (OUTPATIENT)
Dept: GENERAL RADIOLOGY | Facility: CLINIC | Age: 24
End: 2019-05-10
Attending: PODIATRIST
Payer: COMMERCIAL

## 2019-05-10 ENCOUNTER — OFFICE VISIT (OUTPATIENT)
Dept: ORTHOPEDICS | Facility: CLINIC | Age: 24
End: 2019-05-10
Payer: COMMERCIAL

## 2019-05-10 DIAGNOSIS — S82.391A OTHER CLOSED FRACTURE OF DISTAL END OF RIGHT TIBIA, INITIAL ENCOUNTER: ICD-10-CM

## 2019-05-10 DIAGNOSIS — M79.671 PAIN OF RIGHT HEEL: ICD-10-CM

## 2019-05-10 DIAGNOSIS — M79.671 PAIN OF RIGHT HEEL: Primary | ICD-10-CM

## 2019-05-10 NOTE — PROGRESS NOTES
Chief Complaint   Patient presents with     RECHECK     Right ankle pain           No Known Allergies      Subjective: Yann is a 24 year old male who presents to the clinic today for a follow up of right ankle pain. He recently saw Dr. Noe for this as well. He repeated the mechanism about 2 weeks ago and had a significant increase in pain. He is going to [facility] next week.   Nature: sharp/dull/aching    Location: back of the right ankle    Duration: 1 month    Onset: sudden. Occurred after falling on a plantarflexed foot while playing [employer]. He did feel a pop in the ankle    Course: feels a pop at night and when walking.     Aggravating/alleviating factors: walking is aggravating. Rest alleviates.     Previous Treatments: Ice.      Objective  Data Unavailable Data Unavailable Data Unavailable Data Unavailable Data Unavailable 0 lbs 0 oz  Pain noted at the insertion of the right Achilles tendon. Pain noted with palpation of the sustentaculum john on the right ankle. No pain along the courses of the PT, peroneal, or Achilles tendons on the right. Normal Kapoor's test.  No pain with active or passive ROM to the right ankle. MMT 5/5. He can heel raise singularly and double, but with repeated motion, causes pain in the PT tendon.     right calcaneal xrays indicated in 2 weightbearing views.    Age indeterminate radiolucency to the posterior tibia that is noted on the previous XR. No fracture line definitive on the views today in the sustentaculum.         Assessment: Repeated trauma to the right ankle - possible posterior tibial fracture. May have pathology in the sustentaculum john. Most of his pain today was palpating the calcaneus and not in the tendons. Will obtain a CT of the ankle for verification.     Plan:   - Pt seen and evaluated  - Will start a CAM boot on the right foot. Cont to use through his trip.  - CT order given.  - Will call with CT results.

## 2019-05-10 NOTE — NURSING NOTE
Reason For Visit:   Chief Complaint   Patient presents with     RECHECK     Right ankle pain        There were no vitals taken for this visit.    Pain Assessment  Patient Currently in Pain: Denies  Primary Pain Location: Ankle(Pain up to a 6 with certain movements)    Nelly Mercer ATC

## 2019-05-10 NOTE — LETTER
5/10/2019      RE: Yann Perales  [address]  [place] [address]       Chief Complaint   Patient presents with     RECHECK     Right ankle pain           No Known Allergies      Subjective: Yann is a 24 year old male who presents to the clinic today for a follow up of right ankle pain. He recently saw Dr. Noe for this as well. He repeated the mechanism about 2 weeks ago and had a significant increase in pain. He is going to [place] next week.   Nature: sharp/dull/aching    Location: back of the right ankle    Duration: 1 month    Onset: sudden. Occurred after falling on a plantarflexed foot while playing rac[employer]ogot[employer]. He did feel a pop in the ankle    Course: feels a pop at night and when walking.     Aggravating/alleviating factors: walking is aggravating. Rest alleviates.     Previous Treatments: Ice.      Objective  Data Unavailable Data Unavailable Data Unavailable Data Unavailable Data Unavailable 0 lbs 0 oz  Pain noted at the insertion of the right Achilles tendon. Pain noted with palpation of the sustentaculum john on the right ankle. No pain along the courses of the PT, peroneal, or Achilles tendons on the right. Normal Kapoor's test.  No pain with active or passive ROM to the right ankle. MMT 5/5. He can heel raise singularly and double, but with repeated motion, causes pain in the PT tendon.     right calcaneal xrays indicated in 2 weightbearing views.    Age indeterminate radiolucency to the posterior tibia that is noted on the previous XR. No fracture line definitive on the views today in the sustentaculum.         Assessment: Repeated trauma to the right ankle - possible posterior tibial fracture. May have pathology in the sustentaculum john. Most of his pain today was palpating the calcaneus and not in the tendons. Will obtain a CT of the ankle for verification.     Plan:   - Pt seen and evaluated  - Will start a CAM boot on the right foot. Cont to use through his trip.  - CT order  given.  - Will call with CT results.         Mihai Matthews DPM

## 2019-05-10 NOTE — LETTER
5/10/2019       RE: Yann Perales  [address]  [place] [address]     Dear Colleague,    Thank you for referring your patient, Yann Perales, to the [facility]. Please see a copy of my visit note below.    Chief Complaint   Patient presents with     RECHECK     Right ankle pain           No Known Allergies      Subjective: Yann is a 24 year old male who presents to the clinic today for a follow up of right ankle pain. He recently saw Dr. Noe for this as well. He repeated the mechanism about 2 weeks ago and had a significant increase in pain. He is going to [place] next week.   Nature: sharp/dull/aching    Location: back of the right ankle    Duration: 1 month    Onset: sudden. Occurred after falling on a plantarflexed foot while playing [employer]. He did feel a pop in the ankle    Course: feels a pop at night and when walking.     Aggravating/alleviating factors: walking is aggravating. Rest alleviates.     Previous Treatments: Ice.      Objective  Data Unavailable Data Unavailable Data Unavailable Data Unavailable Data Unavailable 0 lbs 0 oz  Pain noted at the insertion of the right Achilles tendon. Pain noted with palpation of the sustentaculum john on the right ankle. No pain along the courses of the PT, peroneal, or Achilles tendons on the right. Normal Kapoor's test.  No pain with active or passive ROM to the right ankle. MMT 5/5. He can heel raise singularly and double, but with repeated motion, causes pain in the PT tendon.     right calcaneal xrays indicated in 2 weightbearing views.    Age indeterminate radiolucency to the posterior tibia that is noted on the previous XR. No fracture line definitive on the views today in the sustentaculum.         Assessment: Repeated trauma to the right ankle - possible posterior tibial fracture. May have pathology in the sustentaculum john. Most of his pain today was palpating the calcaneus  and not in the tendons. Will obtain a CT of the ankle for verification.     Plan:   - Pt seen and evaluated  - Will start a CAM boot on the right foot. Cont to use through his trip.  - CT order given.  - Will call with CT results.         Again, thank you for allowing me to participate in the care of your patient.      Sincerely,    Mihai Matthews DPM

## 2019-05-14 ENCOUNTER — TELEPHONE (OUTPATIENT)
Dept: ORTHOPEDICS | Facility: CLINIC | Age: 24
End: 2019-05-14

## 2019-05-24 ENCOUNTER — DOCUMENTATION ONLY (OUTPATIENT)
Dept: SLEEP MEDICINE | Facility: CLINIC | Age: 24
End: 2019-05-24
Payer: COMMERCIAL

## 2019-05-24 NOTE — PROGRESS NOTES
Patient came to [place] for mask fitting appointment on May 24, 2019. Patient requested to try a different mask or maybe get the same mask he's currently using which is the Respironics Dreamwear nasal mask size large cushoin. Patient tried on the following masks: Resmed Airfit N30i nasal size medium cushion with standard headgear. Patient is aware that we honor a one time 30 day mask exchange starting today's day. Patient did not want any other supplies.

## 2019-05-29 ENCOUNTER — OFFICE VISIT (OUTPATIENT)
Dept: ORTHOPEDICS | Facility: CLINIC | Age: 24
End: 2019-05-29
Payer: COMMERCIAL

## 2019-05-29 DIAGNOSIS — S99.911D RIGHT ANKLE INJURY, SUBSEQUENT ENCOUNTER: Primary | ICD-10-CM

## 2019-05-29 DIAGNOSIS — M77.8 LEFT WRIST TENDONITIS: ICD-10-CM

## 2019-05-29 NOTE — LETTER
5/29/2019      RE: Yann Perales  [address]  [facility] [address]        Subjective:   Yann Perales is a 24 year old male who is following up for his right foot. Doesn't hurt when not doing anything.  Walking a lot without CAM boot it would likely hurt.  Thinks the pain has gotten better.  Painful in plantar flexion.  Hard to describe his pain.  Leaving school for the summer next week.    Date of injury: NA  Date last seen: 5/14/2019  Following Therapeutic Plan: Yes   Pain: Improving  Function: Improving  Interval History:  CT ankle- deformity of distal tibia    PAST MEDICAL, SOCIAL, SURGICAL AND FAMILY HISTORY: He  has a past medical history of ISABEL (obstructive sleep apnea) Mild (9/27/2018).  He  has no past surgical history on file.  His family history is not on file.  He reports that he has never smoked. He has never used smokeless tobacco. He reports that he drinks alcohol. He reports that he does not use drugs.    ALLERGIES: He has No Known Allergies.    CURRENT MEDICATIONS: He currently has no medications in their medication list.     REVIEW OF SYSTEMS: 10 point review of systems is negative except as noted above.     Exam:   There were no vitals taken for this visit.           CONSTITUTIONAL: healthy, alert, mild distress and cooperative  HEAD: Normocephalic. No masses, lesions, tenderness or abnormalities  SKIN: no suspicious lesions or rashes  GAIT: normal  NEUROLOGIC: Non-focal, Normal muscle tone and strength, reflexes normal, sensation grossly normal.  PSYCHIATRIC: affect normal/bright and mentation appears normal.    MUSCULOSKELETAL: right heel pain, left wrist pain    Painful left wrist - volar, flexor carpi ulnaris    ANKLE- difficulty palpating an area of discomfort at this exam time  Inspection/Palpation:     Swelling: no swelling      Non-tender: ATFL, CFL, PTFL, medial malleolus, deltoid ligament, anterior tib-fib ligament, achilles  tendon, no achilles tendon defect   Range of Motion:  dorsiflexion: full, plantarflexion: decreased, inversion: full, eversion: full  Strength:dorsiflexion: 4/5, plantarflexion: 5/5, inversion: 5/5, eversion: 5/5   Special tests: negative anterior drawer, negative varus stress, negative valgus stress, negative forced external rotation, negative Kapoor sign     FOOT  Inspection/Palpation:      Swelling: no swelling     Non-tender: promixal 5th metatarsal, 1st, 2nd, 3rd, 4th, 5th metatarsals, calcaneous, cuboid,  navicular, cuneiform lateral, cuneiform middle, cuneiform medial, metatarsal heads, peroneal tendon: at lateral malleolus, at cuboid, at proximal 5th metatarsal, posterior tibial tendon at medial malleolus, posterior tibial tendon at navicular, plantar fascia  Range of Motion: flexion of toes: full, extension of toes: full         Assessment/Plan:   Pt is a 25 yo male with PMHx of right ankle injury, mild ISABEL presenting to f/u on left wrist and right ankle pain  1. Left wrist tendonitis- unable to trigger pain today  Interested in getting back to weights  I think this can be done with modifications to   If worsening pain or unable to do this I would obtain an MRI  OT for exercises prior to leaving campus  2. Right ankle pain- posterior tibiofibular ligament injury  In CAM boot  Progress out of CAM boot into shoes  Agrees to PT prior to leaving school for the summer    RTC prn    X-RAY INTERPRETATION:   CT scan right ankle  Impression:   1. Corresponding to the radiographic abnormality, well-corticated  ossicle with underlying bony contour deformity of distal tibia at the  expected location of posterior tibiofibular ligament attachment, most  compatible with remote injury.  2. No displaced fracture.    Kaleigh Noe MD

## 2019-05-29 NOTE — PROGRESS NOTES
Subjective:   Yann Perales is a 24 year old male who is following up for his right foot. Doesn't hurt when not doing anything.  Walking a lot without CAM boot it would likely hurt.  Thinks the pain has gotten better.  Painful in plantar flexion.  Hard to describe his pain.  Leaving school for the summer next week.    Date of injury: NA  Date last seen: 5/14/2019  Following Therapeutic Plan: Yes   Pain: Improving  Function: Improving  Interval History:  CT ankle- deformity of distal tibia    PAST MEDICAL, SOCIAL, SURGICAL AND FAMILY HISTORY: He  has a past medical history of ISABEL (obstructive sleep apnea) Mild (9/27/2018).  He  has no past surgical history on file.  His family history is not on file.  He reports that he has never smoked. He has never used smokeless tobacco. He reports that he drinks alcohol. He reports that he does not use drugs.    ALLERGIES: He has No Known Allergies.    CURRENT MEDICATIONS: He currently has no medications in their medication list.     REVIEW OF SYSTEMS: 10 point review of systems is negative except as noted above.     Exam:   There were no vitals taken for this visit.           CONSTITUTIONAL: healthy, alert, mild distress and cooperative  HEAD: Normocephalic. No masses, lesions, tenderness or abnormalities  SKIN: no suspicious lesions or rashes  GAIT: normal  NEUROLOGIC: Non-focal, Normal muscle tone and strength, reflexes normal, sensation grossly normal.  PSYCHIATRIC: affect normal/bright and mentation appears normal.    MUSCULOSKELETAL: right heel pain, left wrist pain    Painful left wrist - volar, flexor carpi ulnaris    ANKLE- difficulty palpating an area of discomfort at this exam time  Inspection/Palpation:     Swelling: no swelling      Non-tender: ATFL, CFL, PTFL, medial malleolus, deltoid ligament, anterior tib-fib ligament, achilles  tendon, no achilles tendon defect   Range of Motion: dorsiflexion: full, plantarflexion: decreased, inversion: full, eversion:  full  Strength:dorsiflexion: 4/5, plantarflexion: 5/5, inversion: 5/5, eversion: 5/5   Special tests: negative anterior drawer, negative varus stress, negative valgus stress, negative forced external rotation, negative Kapoor sign     FOOT  Inspection/Palpation:      Swelling: no swelling     Non-tender: promixal 5th metatarsal, 1st, 2nd, 3rd, 4th, 5th metatarsals, calcaneous, cuboid,  navicular, cuneiform lateral, cuneiform middle, cuneiform medial, metatarsal heads, peroneal tendon: at lateral malleolus, at cuboid, at proximal 5th metatarsal, posterior tibial tendon at medial malleolus, posterior tibial tendon at navicular, plantar fascia  Range of Motion: flexion of toes: full, extension of toes: full         Assessment/Plan:   Pt is a 23 yo male with PMHx of right ankle injury, mild ISABEL presenting to f/u on left wrist and right ankle pain  1. Left wrist tendonitis- unable to trigger pain today  Interested in getting back to weights  I think this can be done with modifications to   If worsening pain or unable to do this I would obtain an MRI  OT for exercises prior to leaving campus  2. Right ankle pain- posterior tibiofibular ligament injury  In CAM boot  Progress out of CAM boot into shoes  Agrees to PT prior to leaving school for the summer    RTC prn    X-RAY INTERPRETATION:   CT scan right ankle  Impression:   1. Corresponding to the radiographic abnormality, well-corticated  ossicle with underlying bony contour deformity of distal tibia at the  expected location of posterior tibiofibular ligament attachment, most  compatible with remote injury.  2. No displaced fracture.

## 2019-06-03 ENCOUNTER — THERAPY VISIT (OUTPATIENT)
Dept: PHYSICAL THERAPY | Facility: CLINIC | Age: 24
End: 2019-06-03
Payer: COMMERCIAL

## 2019-06-03 DIAGNOSIS — S99.911D RIGHT ANKLE INJURY, SUBSEQUENT ENCOUNTER: ICD-10-CM

## 2019-06-03 DIAGNOSIS — M25.571 ACUTE RIGHT ANKLE PAIN: ICD-10-CM

## 2019-06-03 PROCEDURE — 97161 PT EVAL LOW COMPLEX 20 MIN: CPT | Mod: GP | Performed by: PHYSICAL THERAPIST

## 2019-06-03 PROCEDURE — 97112 NEUROMUSCULAR REEDUCATION: CPT | Mod: GP | Performed by: PHYSICAL THERAPIST

## 2019-06-04 ENCOUNTER — THERAPY VISIT (OUTPATIENT)
Dept: OCCUPATIONAL THERAPY | Facility: CLINIC | Age: 24
End: 2019-06-04
Payer: COMMERCIAL

## 2019-06-04 DIAGNOSIS — M25.532 LEFT WRIST PAIN: ICD-10-CM

## 2019-06-04 DIAGNOSIS — M77.8 LEFT WRIST TENDONITIS: ICD-10-CM

## 2019-06-04 PROCEDURE — 97110 THERAPEUTIC EXERCISES: CPT | Mod: GO | Performed by: OCCUPATIONAL THERAPIST

## 2019-06-04 PROCEDURE — 97165 OT EVAL LOW COMPLEX 30 MIN: CPT | Mod: GO | Performed by: OCCUPATIONAL THERAPIST

## 2019-06-04 PROCEDURE — 97140 MANUAL THERAPY 1/> REGIONS: CPT | Mod: GO | Performed by: OCCUPATIONAL THERAPIST

## 2019-06-04 NOTE — PROGRESS NOTES
Hand Therapy Initial Evaluation    Current Date:  6/4/2019    Diagnosis: L wrist pain  DOI: May 2019   Procedure:  none    Precautions: NA    Subjective:  Yann Perales is a 24 year old left hand dominant male.    Patient reports symptoms of pain and weakness/loss of strength of the left wrist which occurred due to not sure, seems to be from riding bike. Since onset symptoms are Gradually getting better.  Special tests:  x-ray.  Previous treatment: none.    General health as reported by patient is fair.  Pertinent medical history includes:None  Medical allergies:none.  Surgical history: none.  Medication history: None.    Occupational Profile Information:  Current occupation is student-computer science  Currently working in normal job without restrictions  Job Tasks: Computer Work, Prolonged Sitting  Prior functional level:  no limitations  Barriers include:none  Mobility: No difficulty  Transportation: drives and bicycles  Leisure activities/hobbies: weight lifting, biking    Functional Outcome Measure:   Upper Extremity Functional Index Score:  SCORE:   Column Totals: /80: 72   (A lower score indicates greater disability.)    Objective:  Pain Level (Scale 0-10)   6/4/2019   At Rest 0/10   At Worst: 8/10     Pain Description  Date 6/4/2019   Location L wrist, volar/ulnar side   Pain Quality Sharp   Frequency intermittent     Pain is worst  daytime   Exacerbated by  forced wrist extension, biking   Relieved by rest   Progression improving     Edema:  MILD  Sensation:  WNL throughout all nerve distributions; per patient report    ROM  Pain Report:  + mild    ++ moderate    +++ severe   Wrist 6/4/2019 6/4/2019   AROM (PROM) right left   Extension 73 77   Flexion 74 77   RD 25 24   UD 37 40   Supination 88 80   Pronation 90 85     Tenderness: Pain level report on scale 0-10/10  Date 6/4/2019   Side    Ulna Styloid -   TFCC -     Resistance 6/4/2019   FCU -     Strength   (Measured in pounds)  Pain Report:  + mild    ++  moderate    +++ severe    6/4/2019 6/4/2019   Trials right left   1  2  3 83  86  79 78  73  76   Average 83 76     Assessment:  Patient presents with symptoms consistent with diagnosis of left wrist pain, with conservative intervention.     Patient's limitations or Problem List includes:  Pain, Decreased ROM/motion and Weakness of the left wrist which interferes with the patient's ability to perform Self Care Tasks (dressing, eating, bathing, hygiene/toileting), Work Tasks, Sleep Patterns, Recreational Activities, Household Chores and Driving  as compared to previous level of function.    Rehab Potential:  Excellent - Return to full activity, no limitations    Patient will benefit from skilled Occupational Therapy to increase ROM, overall strength and stability of wrist and decrease pain and edema to return to previous activity level and resume normal daily tasks and to reach their rehab potential.    Barriers to Learning:  No barrier    Communication Issues:  Patient appears to be able to clearly communicate and understand verbal and written communication and follow directions correctly.    Chart Review: Simple history review with patient    Identified Performance Deficits: work and leisure activities    Assessment of Occupational Performance:  1-3 Performance Deficits    Clinical Decision Making (Complexity): Low complexity    Treatment Explanation:  The following has been discussed with the patient:  RX ordered/plan of care  Anticipated outcomes  Possible risks and side effects    Plan:  Frequency:  2 X a month, once daily  Duration:  for 4 months    Treatment Plan:   Modalities:  US, Fluidotherapy and Paraffin  Therapeutic Exercise:  AROM, AAROM, PROM, Tendon Gliding, Isotonics, Isometrics and Stabilization  Manual Techniques:  Myofascial release  Orthotic Fabrication:  Static orthosis  Self Care:  Self Care Tasks  Discharge Plan:  Achieve all LTG.  Independent in home treatment program.  Reach maximal  therapeutic benefit.    Home Exercise Program:  Tendon gliding  Wrist AROM  Wrist stability  massage    Next Visit:  Pt. To be gone for summer, but will return if needs to this fall.

## 2019-06-05 PROBLEM — M25.532 LEFT WRIST PAIN: Status: ACTIVE | Noted: 2019-06-05

## 2019-08-28 PROBLEM — M25.532 LEFT WRIST PAIN: Status: RESOLVED | Noted: 2019-06-05 | Resolved: 2019-08-28

## 2019-09-29 PROBLEM — M25.571 ACUTE RIGHT ANKLE PAIN: Status: RESOLVED | Noted: 2019-06-03 | Resolved: 2019-09-29

## 2019-12-06 ENCOUNTER — OFFICE VISIT (OUTPATIENT)
Dept: SLEEP MEDICINE | Facility: CLINIC | Age: 24
End: 2019-12-06
Payer: COMMERCIAL

## 2019-12-06 VITALS
RESPIRATION RATE: 16 BRPM | HEIGHT: 71 IN | SYSTOLIC BLOOD PRESSURE: 112 MMHG | WEIGHT: 201 LBS | BODY MASS INDEX: 28.14 KG/M2 | HEART RATE: 71 BPM | DIASTOLIC BLOOD PRESSURE: 62 MMHG | OXYGEN SATURATION: 96 %

## 2019-12-06 DIAGNOSIS — G47.33 OSA (OBSTRUCTIVE SLEEP APNEA): Primary | ICD-10-CM

## 2019-12-06 PROCEDURE — 99214 OFFICE O/P EST MOD 30 MIN: CPT | Performed by: INTERNAL MEDICINE

## 2019-12-06 ASSESSMENT — MIFFLIN-ST. JEOR: SCORE: 1933.86

## 2019-12-06 NOTE — PATIENT INSTRUCTIONS
[employer]  Https://mms.aadsm.org/members/directory/search_bootstrap.php?org_id=ADSM    1.  Continue CPAP every night for all hours that you are sleeping.  If you nap use CPAP.  As always, try to get at least 8 hours of sleep or more each day, and avoid sleep deprivation. Avoid alcohol.    2.  Reasons that you might need a change to your pressure therapy would be weight gain or loss, waking having inadvertently removed your CPAP overnight, having previously felt refreshed by sleep with CPAP use and now waking un-refreshed, and return of daytime sleepiness. Also, the development of new medical problems can sometimes affect breathing at night-heart failure, stroke, medications such as narcotics.    3.  Please bring CPAP with you if you are hospitalized.  If anticipating surgery be sure to discuss with your surgeon that you have sleep apnea and use PAP therapy.      4.  Maintain your equipment as recommended which includes routine cleaning and replacement of supplies.  Call DME for any questions regarding supplies or maintenance.      5.  Do not drive on engage in potentially dangerous activities if feeling sleepy.    6.  Please see me again in 12 months and bring your machine and card to your follow-up visit.  Or establish care with another provider    [facility] DME and cpap specialist [phone]  [facility] [phone]    [facility] [phone], CPAP specialist [phone]  [facility] [phone]    [place] [facility], [facility] [phone]    [facility]  [place] [phone]  [facility] [place] [phone]  [facility] phone [phone]         Tips for your CPAP and BIPAP use-    Mask fitting tips  Mask fitting exercise:    To improve your mask seal and your mobility  at night, put mask on and secure in place.  Lie down in bed with full pressure and roll to one side, adjust headgear while in that position to eliminate any leaks. Repeat process rolling to other side.     The mask seal does not have to be perfect:   CPAP machines are designed to make up for small leaks. However, you will not tolerate leaks blowing in your eyes so you will need to adjust.   Any leak should only be near or at the bottom of the mask.  We expect your mask to leak slightly at night.    Do not over-tighten the headgear straps, tighter IS NOT better, we expect minimal leak.    First try re-positioning the mask or headgear before tightening the headgear straps.  Mask leaks are expected due to changing sleeping positions. Try pulling the mask away from your skin allowing the cushion to re-inflate will minimize the leak.  If you struggle for a good fit, try turning the CPAP off and then readjust the mask by pulling it away from your face and then turning back on the CPAP.        Humidifier tips  Humidifiers can be adjusted to increase or decrease the amount of moisture according to your comfort level. You may need to adjust this frequently at first, but then might only change it with seasonal weather changes.     Try INCREASING the humidity if:  You experience a dry, irritated nasal passage or throat.  You have a runny, drippy nose or sneezing fits after using CPAP.  You experience nasal congestion during or after CPAP use.    Try DECREASING the humidity if:  You have excessive condensation or  rain out  in the tubing or mask.  Otherwise keep the tubing warm during the night by running it underneath the blankets or pillow.      Clinic visit after initial CPAP and BIPAP set-up   Bring your equipment with you to your 4 week follow up clinic visit.  We will be extracting your data from the machine.        Travel  Always take your equipment with you.  If you fly with your equipment bring it on with you as a  carry on.  Medical equipment does not count as a carry on.    If you travel international the machines take 110-240.  The only adapter needed is the adapter that will fit into the receptacle (outlet).    You may also want to bring an extension cord as many hotel rooms have limited outlets at the bedside.  Do not travel with water in your humidifier chamber.     Cleaning and Maintenance Guidelines    Equipment Frequency Cleaning Method   Mask First Day    Daily      Weekly Soak mask in hot soapy water for 30 minutes, rinse and air dry.  Wipe nasal cushion with a hot soapy (Ivory, baby shampoo) cloth and rinse.  Baby wipes may also be used.  Do not use anti-bacterial soaps,Nicolette  liquid soap, rubbing alcohol, bleach or ammonia.  Wash frame in hot soapy water (Ivory, baby shampoo) rinse and let air dry   Headgear Biweekly Wash in hot soapy water, rinse and air dry   Reusable Gray Filter Weekly Wash in hot soapy water, rinse, put in towel squeeze moisture out, let air dry   Disposable White Filter Check Weekly Replace when brown or gray in color; at least every 2 to 3 months   Humidifier Chamber Daily    Weekly Empty distilled water from humidifier and let air dry    Hand wash in hot soapy water, rinse and air dry   Tubing Weekly Wash in hot soapy water, rinse and let air dry   Mask, Tubing and Humidifier Chamber As needed Disinfect: Soak in 1 part vinegar to 3 parts hot water for 30 minutes, rinse well and air dry  Not the material headgear        MASK AND SUPPLY REORDERING  Reminder: Most insurance companies will allow for a new mask, headgear, tubing, and reusable gray filter every six months.  Disposable white ultra-fine filters are covered monthly.    EQUIPMENT NEEDS  Please call our CPAP specialist with any equipment problems, questions or needs.    HOME AND SAFETY INSTRUCTIONS    Do not use frayed or cracked electrical cords, multi plug adaptors, or switched receptacles    Do not immerse electrical equipment into  water    Assure that electrical cords do not become a tripping hazard          Your BMI is Body mass index is 28.03 kg/m .  Weight management is a personal decision.  If you are interested in exploring weight loss strategies, the following discussion covers the approaches that may be successful. Body mass index (BMI) is one way to tell whether you are at a healthy weight, overweight, or obese. It measures your weight in relation to your height.  A BMI of 18.5 to 24.9 is in the healthy range. A person with a BMI of 25 to 29.9 is considered overweight, and someone with a BMI of 30 or greater is considered obese. More than two-thirds of American adults are considered overweight or obese.  Being overweight or obese increases the risk for further weight gain. Excess weight may lead to heart disease and diabetes.  Creating and following plans for healthy eating and physical activity may help you improve your health.  Weight control is part of healthy lifestyle and includes exercise, emotional health, and healthy eating habits. Careful eating habits lifelong are the mainstay of weight control. Though there are significant health benefits from weight loss, long-term weight loss with diet alone may be very difficult to achieve- studies show long-term success with dietary management in less than 10% of people. Attaining a healthy weight may be especially difficult to achieve in those with severe obesity. In some cases, medications, devices and surgical management might be considered.  What can you do?  If you are overweight or obese and are interested in methods for weight loss, you should discuss this with your provider.     Consider reducing daily calorie intake by 500 calories.     Keep a food journal.     Avoiding skipping meals, consider cutting portions instead.    Diet combined with exercise helps maintain muscle while optimizing fat loss. Strength training is particularly important for building and maintaining muscle  mass. Exercise helps reduce stress, increase energy, and improves fitness. Increasing exercise without diet control, however, may not burn enough calories to loose weight.       Start walking three days a week 10-20 minutes at a time    Work towards walking thirty minutes five days a week     Eventually, increase the speed of your walking for 1-2 minutes at time    In addition, we recommend that you review healthy lifestyles and methods for weight loss available through the National Institutes of Health patient information sites:  http://win.niddk.nih.gov/publications/index.htm    And look into health and wellness programs that may be available through your health insurance provider, employer, local community center, or braulio club.    Weight management plan: Patient was referred to their PCP to discuss a diet and exercise plan.

## 2019-12-06 NOTE — PROGRESS NOTES
Chief complaint: Follow-up of sleep apnea    History of Present Illness: 24-year-old gentleman with history of daytime sleepiness found to have mild obstructive sleep apnea.  He is currently using CPAP therapy at his last visit he was interested in alternative treatments but did not end up following through with referral for oral appliance therapy.  He continues to use CPAP most nights.  He does admit sometimes he wakes up and feels like too much pressure in his nasal passages.  He uses nasal pillows.  He denies issues with nasal congestion or difficulty breathing through his nose.  He does use the humidifier.  If he does not use the machine he does feel that his sleep quality is negatively impacted.  He is aware that his sleep apnea is worse when he is on his back.  He is trying to avoid sleeping on his back.    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  He anticipates moving to [place].  He will be looking for a job and needing to reestablish healthcare needs.  His insurance will be changing.    He denies the development of new major sleep concerns.  No restless legs.  He does admit to sometimes difficulty falling asleep.  He thinks it may be related to some anxiety.  He also notes waking up towards morning feeling very hot.  He is not taking naps.  He occasionally takes a melatonin.  He does drink occasional caffeinated beverages.    Bradycardia was noted on a PSG.  He has not had any episodes of near syncope since his last visit.  Denies any concerns about bradycardia at this time.    Total score - [place]: 10 (12/6/2019  1:04 PM) (Less than 10 normal)    ELIZABETH Total Score: 16 (normal 0-7, mild 8-14, moderate 15-21, severe 22-28)    Past Medical History:   Diagnosis Date     ISABEL (obstructive sleep apnea) Mild 9/27/2018    PSG at [facility] 4/13/2018 wt 191 AHI 12.5, supine predominant       No Known Allergies    No current outpatient medications on file.     No current  "facility-administered medications for this visit.        Social History     Socioeconomic History     Marital status: Single     Spouse name: Not on file     Number of children: Not on file     Years of education: Not on file     Highest education level: Not on file   Occupational History     Not on file   Social Needs     Financial resource strain: Not on file     Food insecurity:     Worry: Not on file     Inability: Not on file     Transportation needs:     Medical: Not on file     Non-medical: Not on file   Tobacco Use     Smoking status: Never Smoker     Smokeless tobacco: Never Used   Substance and Sexual Activity     Alcohol use: Yes     Comment: very occasionally     Drug use: No     Sexual activity: Not on file   Lifestyle     Physical activity:     Days per week: Not on file     Minutes per session: Not on file     Stress: Not on file   Relationships     Social connections:     Talks on phone: Not on file     Gets together: Not on file     Attends Yazidi service: Not on file     Active member of club or organization: Not on file     Attends meetings of clubs or organizations: Not on file     Relationship status: Not on file     Intimate partner violence:     Fear of current or ex partner: Not on file     Emotionally abused: Not on file     Physically abused: Not on file     Forced sexual activity: Not on file   Other Topics Concern     Parent/sibling w/ CABG, MI or angioplasty before 65F 55M? Not Asked   Social History Narrative     Not on file         EXAM: Does not, alert, no distress  /62   Pulse 71   Resp 16   Ht 1.803 m (5' 11\")   Wt 91.2 kg (201 lb)   SpO2 96%   BMI 28.03 kg/m    Psychiatric: Mood and affect appear normal    PSG 4/13/2018 ESS12  Wt191  AHI 12.5     PAP download from 11/6/2019 to 12/5/2019 reviewed:  Per cent of days used greater than 4 Hours 77 % (minimum goal greater than 70%)  Average use on days used: 6 hours 24 min  Settings: Min EPAP 7 cmH2O    Max EPAP 13 " cmH2O  Pressures delivered 90/95th percentile for pressure 8 cmH2O  Average AHI 0.9 events per hour (goal less than 5)  Leak acceptable    ASSESSMENT: 24-year-old gentleman with history of mild overall supine predominant obstructive sleep apnea.  He gets benefit from the use of CPAP and is meeting compliance goals.  However he remains a good candidate for oral appliance therapy.  He may tolerate that better.  Occasional issues with sleep initiation and sleep offset insomnia.    PLAN: Reviewed alternative treatments including position restriction devices, oral appliances.  Encouraged him to continue efforts at sleeping on his sides.  Prescription generated to keep his CPAP supplies up-to-date.  Patient was per will be provided copies of referrals which will hopefully help him transfer care when down in [place].  He is reminded to inform providers about his diagnosis should he go to any procedures requiring sedation.  Patient was counseled on the importance of regular sleep routine and ways to manage his sleep initiation and sleep onset insomnia complaints.  He is avoiding naps.  Please see patient instructions for further details of some of the other counseling provided.  He is agreeable with this plan.    Twenty-five minutes spent with patient, >50% spent counseling and coordinating care.      Светлана Walls M.D.  Pulmonary/Critical Care/Sleep Medicine    [facility]   Floor 1, [address]   [address] [place], [place] [address]. [place] [address]   Appointments: [phone]    The above note was dictated using voice recognition software and may include typographical errors. Please contact the author for any clarifications.

## 2020-01-31 ENCOUNTER — OFFICE VISIT (OUTPATIENT)
Dept: ORTHOPEDICS | Facility: CLINIC | Age: 25
End: 2020-01-31
Payer: COMMERCIAL

## 2020-01-31 ENCOUNTER — ANCILLARY PROCEDURE (OUTPATIENT)
Dept: GENERAL RADIOLOGY | Facility: CLINIC | Age: 25
End: 2020-01-31
Attending: FAMILY MEDICINE
Payer: COMMERCIAL

## 2020-01-31 VITALS — WEIGHT: 198 LBS | HEIGHT: 71 IN | RESPIRATION RATE: 16 BRPM | BODY MASS INDEX: 27.72 KG/M2

## 2020-01-31 DIAGNOSIS — S99.911A RIGHT ANKLE INJURY, INITIAL ENCOUNTER: Primary | ICD-10-CM

## 2020-01-31 DIAGNOSIS — S99.911A RIGHT ANKLE INJURY, INITIAL ENCOUNTER: ICD-10-CM

## 2020-01-31 ASSESSMENT — MIFFLIN-ST. JEOR: SCORE: 1920.25

## 2020-01-31 NOTE — LETTER
"  1/31/2020      RE: Yann Perales  [address] [place] [address]        Subjective:   Yann Perales is a 24 year old male who presents with right ankle pain. He was doing [employer] and another person plantarflexed his right foot and heard a pop. Denies swelling, taking ibuprofen. He bends his ankle, under lateral malleolus painful.  Pt reports ankle was back to normal after last ligament injury.  Pain in toes day after, worse with ankle dorsiflexion and pain radiating into his toes.  Taking ibuprofen, no ice.  Improving over past couple days.  Hx of posterior tibiofibular ligament injury    Date of injury: 1/28/20  Date last seen: Visit date not found  Following Therapeutic Plan: Yes     PAST MEDICAL, SOCIAL, SURGICAL AND FAMILY HISTORY: He  has a past medical history of ISABEL (obstructive sleep apnea) Mild (9/27/2018).  He  has no past surgical history on file.  His family history is not on file.  He reports that he has never smoked. He has never used smokeless tobacco. He reports current alcohol use. He reports that he does not use drugs.    ALLERGIES: He has No Known Allergies.    CURRENT MEDICATIONS: He currently has no medications in their medication list.     REVIEW OF SYSTEMS: 10 point review of systems is negative except as noted above.     Exam:   Resp 16   Ht 1.803 m (5' 11\")   Wt 89.8 kg (198 lb)   BMI 27.62 kg/m              CONSTITUTIONAL: healthy, alert, no distress and cooperative  HEAD: Normocephalic. No masses, lesions, tenderness or abnormalities  SKIN: no suspicious lesions or rashes  GAIT: normal  NEUROLOGIC: Non-focal  PSYCHIATRIC: affect normal/bright and mentation appears normal.    MUSCULOSKELETAL: right ankle pain    ANKLE  Inspection/Palpation:     Swelling: mild lateral swelling   Tender:ATFL/CFL, lateral malleolus     Non-tender:  PTFL, medial malleolus, deltoid ligament, anterior tib-fib ligament, achilles  tendon, no achilles tendon defect   Range of Motion: dorsiflexion: full, " plantarflexion: full, inversion: painful, eversion: full  Strength:dorsiflexion: 5/5, plantarflexion: 5/5, inversion: 5-/5, eversion: 5/5   Special tests: positive anterior drawer, negative varus stress, negative valgus stress, negative forced external rotation, negative Kapoor sign     FOOT  Inspection/Palpation:      Swelling: no swelling     Non-tender: promixal 5th metatarsal, 1st, 2nd, 3rd, 4th, 5th metatarsals, calcaneous, cuboid,  navicular, cuneiform lateral, cuneiform middle, cuneiform medial, metatarsal heads, peroneal tendon: at lateral malleolus, at cuboid, at proximal 5th metatarsal, posterior tibial tendon at medial malleolus, posterior tibial tendon at navicular, plantar fascia  Range of Motion: flexion of toes: full, extension of toes: full         Assessment/Plan:   Pt is a 23 yo  male with medical history of injury to the anterior tibiofibular ligament right ankle presenting with new right ankle injury  1.  Lateral ankle injury- CFL, ATFL  Ice, ibuprofen  Patient has PT exercises from past ankle injury I suggested that he start with these and work towards improved balance and proprioception  Explained that the ligaments will stretch or tear, healing in this position.  Stressed the importance of lower leg strength.  Patient has seen improvement over the past 2 days.    Return to clinic as needed  X-RAY INTERPRETATION:   X-Ray of the Right Ankle: 3-view, ap, lateral, mortise   ordered and interpreted in the office today was negative for fracture, subluxation or joint space abnormality.    Kaleigh Noe MD

## 2020-01-31 NOTE — PROGRESS NOTES
" Subjective:   Yann Perales is a 24 year old male who presents with right ankle pain. He was doing [employer] and another person plantarflexed his right foot and heard a pop. Denies swelling, taking ibuprofen. He bends his ankle, under lateral malleolus painful.  Pt reports ankle was back to normal after last ligament injury.  Pain in toes day after, worse with ankle dorsiflexion and pain radiating into his toes.  Taking ibuprofen, no ice.  Improving over past couple days.  Hx of posterior tibiofibular ligament injury    Date of injury: 1/28/20  Date last seen: Visit date not found  Following Therapeutic Plan: Yes     PAST MEDICAL, SOCIAL, SURGICAL AND FAMILY HISTORY: He  has a past medical history of ISABEL (obstructive sleep apnea) Mild (9/27/2018).  He  has no past surgical history on file.  His family history is not on file.  He reports that he has never smoked. He has never used smokeless tobacco. He reports current alcohol use. He reports that he does not use drugs.    ALLERGIES: He has No Known Allergies.    CURRENT MEDICATIONS: He currently has no medications in their medication list.     REVIEW OF SYSTEMS: 10 point review of systems is negative except as noted above.     Exam:   Resp 16   Ht 1.803 m (5' 11\")   Wt 89.8 kg (198 lb)   BMI 27.62 kg/m             CONSTITUTIONAL: healthy, alert, no distress and cooperative  HEAD: Normocephalic. No masses, lesions, tenderness or abnormalities  SKIN: no suspicious lesions or rashes  GAIT: normal  NEUROLOGIC: Non-focal  PSYCHIATRIC: affect normal/bright and mentation appears normal.    MUSCULOSKELETAL: right ankle pain    ANKLE  Inspection/Palpation:     Swelling: mild lateral swelling   Tender:ATFL/CFL, lateral malleolus     Non-tender:  PTFL, medial malleolus, deltoid ligament, anterior tib-fib ligament, achilles  tendon, no achilles tendon defect   Range of Motion: dorsiflexion: full, plantarflexion: full, inversion: painful, eversion: full  Strength:dorsiflexion: " 5/5, plantarflexion: 5/5, inversion: 5-/5, eversion: 5/5   Special tests: positive anterior drawer, negative varus stress, negative valgus stress, negative forced external rotation, negative Kapoor sign     FOOT  Inspection/Palpation:      Swelling: no swelling     Non-tender: promixal 5th metatarsal, 1st, 2nd, 3rd, 4th, 5th metatarsals, calcaneous, cuboid,  navicular, cuneiform lateral, cuneiform middle, cuneiform medial, metatarsal heads, peroneal tendon: at lateral malleolus, at cuboid, at proximal 5th metatarsal, posterior tibial tendon at medial malleolus, posterior tibial tendon at navicular, plantar fascia  Range of Motion: flexion of toes: full, extension of toes: full         Assessment/Plan:   Pt is a 25 yo  male with medical history of injury to the anterior tibiofibular ligament right ankle presenting with new right ankle injury  1.  Lateral ankle injury- CFL, ATFL  Ice, ibuprofen  Patient has PT exercises from past ankle injury I suggested that he start with these and work towards improved balance and proprioception  Explained that the ligaments will stretch or tear, healing in this position.  Stressed the importance of lower leg strength.  Patient has seen improvement over the past 2 days.    Return to clinic as needed  X-RAY INTERPRETATION:   X-Ray of the Right Ankle: 3-view, ap, lateral, mortise   ordered and interpreted in the office today was negative for fracture, subluxation or joint space abnormality.